# Patient Record
Sex: FEMALE | Race: WHITE | NOT HISPANIC OR LATINO | Employment: OTHER | URBAN - METROPOLITAN AREA
[De-identification: names, ages, dates, MRNs, and addresses within clinical notes are randomized per-mention and may not be internally consistent; named-entity substitution may affect disease eponyms.]

---

## 2017-04-18 DIAGNOSIS — R73.09 ELEVATED HEMOGLOBIN A1C: ICD-10-CM

## 2017-04-18 DIAGNOSIS — E03.9 ACQUIRED HYPOTHYROIDISM: ICD-10-CM

## 2017-04-18 DIAGNOSIS — I10 BENIGN ESSENTIAL HYPERTENSION: ICD-10-CM

## 2017-04-18 DIAGNOSIS — Z00.00 HEALTH CARE MAINTENANCE: Primary | ICD-10-CM

## 2017-04-18 DIAGNOSIS — R73.03 PREDIABETES: ICD-10-CM

## 2017-04-18 DIAGNOSIS — E78.2 MIXED HYPERLIPIDEMIA: ICD-10-CM

## 2017-04-22 LAB
ALBUMIN SERPL-MCNC: 4.3 G/DL (ref 3.5–5.2)
ALBUMIN/GLOB SERPL: 1.5 G/DL
ALP SERPL-CCNC: 67 U/L (ref 39–117)
ALT SERPL-CCNC: 17 U/L (ref 1–33)
APPEARANCE UR: ABNORMAL
AST SERPL-CCNC: 19 U/L (ref 1–32)
BACTERIA #/AREA URNS HPF: ABNORMAL /HPF
BACTERIA UR CULT: NO GROWTH
BACTERIA UR CULT: NORMAL
BASOPHILS # BLD AUTO: 0.04 10*3/MM3 (ref 0–0.2)
BASOPHILS NFR BLD AUTO: 0.9 % (ref 0–1.5)
BILIRUB SERPL-MCNC: 0.3 MG/DL (ref 0.1–1.2)
BILIRUB UR QL STRIP: NEGATIVE
BUN SERPL-MCNC: 16 MG/DL (ref 8–23)
BUN/CREAT SERPL: 19.3 (ref 7–25)
CALCIUM SERPL-MCNC: 9.7 MG/DL (ref 8.6–10.5)
CASTS URNS MICRO: ABNORMAL
CASTS URNS QL MICRO: PRESENT /LPF
CHLORIDE SERPL-SCNC: 100 MMOL/L (ref 98–107)
CHOLEST SERPL-MCNC: 223 MG/DL (ref 0–200)
CO2 SERPL-SCNC: 27.5 MMOL/L (ref 22–29)
COLOR UR: YELLOW
CREAT SERPL-MCNC: 0.83 MG/DL (ref 0.57–1)
CRYSTALS URNS MICRO: ABNORMAL
EOSINOPHIL # BLD AUTO: 0.07 10*3/MM3 (ref 0–0.7)
EOSINOPHIL NFR BLD AUTO: 1.6 % (ref 0.3–6.2)
EPI CELLS #/AREA URNS HPF: ABNORMAL /HPF
ERYTHROCYTE [DISTWIDTH] IN BLOOD BY AUTOMATED COUNT: 13.9 % (ref 11.7–13)
GLOBULIN SER CALC-MCNC: 2.8 GM/DL
GLUCOSE SERPL-MCNC: 102 MG/DL (ref 65–99)
GLUCOSE UR QL: NEGATIVE
HBA1C MFR BLD: 5.87 % (ref 4.8–5.6)
HCT VFR BLD AUTO: 40.7 % (ref 35.6–45.5)
HDLC SERPL-MCNC: 63 MG/DL (ref 40–60)
HGB BLD-MCNC: 12.8 G/DL (ref 11.9–15.5)
HGB UR QL STRIP: NEGATIVE
IMM GRANULOCYTES # BLD: 0 10*3/MM3 (ref 0–0.03)
IMM GRANULOCYTES NFR BLD: 0 % (ref 0–0.5)
KETONES UR QL STRIP: NEGATIVE
LDLC SERPL CALC-MCNC: 140 MG/DL (ref 0–100)
LEUKOCYTE ESTERASE UR QL STRIP: ABNORMAL
LYMPHOCYTES # BLD AUTO: 2.14 10*3/MM3 (ref 0.9–4.8)
LYMPHOCYTES NFR BLD AUTO: 49.5 % (ref 19.6–45.3)
MCH RBC QN AUTO: 30.8 PG (ref 26.9–32)
MCHC RBC AUTO-ENTMCNC: 31.4 G/DL (ref 32.4–36.3)
MCV RBC AUTO: 98.1 FL (ref 80.5–98.2)
MICRO URNS: ABNORMAL
MONOCYTES # BLD AUTO: 0.31 10*3/MM3 (ref 0.2–1.2)
MONOCYTES NFR BLD AUTO: 7.2 % (ref 5–12)
MUCOUS THREADS URNS QL MICRO: PRESENT /HPF
NEUTROPHILS # BLD AUTO: 1.76 10*3/MM3 (ref 1.9–8.1)
NEUTROPHILS NFR BLD AUTO: 40.8 % (ref 42.7–76)
NITRITE UR QL STRIP: NEGATIVE
PH UR STRIP: 7 [PH] (ref 5–7.5)
PLATELET # BLD AUTO: 230 10*3/MM3 (ref 140–500)
POTASSIUM SERPL-SCNC: 4.3 MMOL/L (ref 3.5–5.2)
PROT SERPL-MCNC: 7.1 G/DL (ref 6–8.5)
PROT UR QL STRIP: NEGATIVE
RBC # BLD AUTO: 4.15 10*6/MM3 (ref 3.9–5.2)
RBC #/AREA URNS HPF: ABNORMAL /HPF
SODIUM SERPL-SCNC: 141 MMOL/L (ref 136–145)
SP GR UR: 1.01 (ref 1–1.03)
TRIGL SERPL-MCNC: 101 MG/DL (ref 0–150)
TSH SERPL DL<=0.005 MIU/L-ACNC: 3.12 MIU/ML (ref 0.27–4.2)
UNIDENT CRYS URNS QL MICRO: PRESENT /LPF
URINALYSIS REFLEX: ABNORMAL
UROBILINOGEN UR STRIP-MCNC: 0.2 MG/DL (ref 0.2–1)
VLDLC SERPL CALC-MCNC: 20.2 MG/DL (ref 5–40)
WBC # BLD AUTO: 4.32 10*3/MM3 (ref 4.5–10.7)
WBC #/AREA URNS HPF: ABNORMAL /HPF

## 2017-04-27 ENCOUNTER — OFFICE VISIT (OUTPATIENT)
Dept: INTERNAL MEDICINE | Facility: CLINIC | Age: 67
End: 2017-04-27

## 2017-04-27 VITALS
OXYGEN SATURATION: 99 % | RESPIRATION RATE: 18 BRPM | SYSTOLIC BLOOD PRESSURE: 124 MMHG | HEART RATE: 72 BPM | WEIGHT: 164 LBS | BODY MASS INDEX: 24.86 KG/M2 | DIASTOLIC BLOOD PRESSURE: 82 MMHG | HEIGHT: 68 IN

## 2017-04-27 DIAGNOSIS — Z00.00 MEDICARE ANNUAL WELLNESS VISIT, SUBSEQUENT: ICD-10-CM

## 2017-04-27 DIAGNOSIS — I10 BENIGN ESSENTIAL HYPERTENSION: ICD-10-CM

## 2017-04-27 DIAGNOSIS — Z00.00 HEALTH MAINTENANCE EXAMINATION: Primary | ICD-10-CM

## 2017-04-27 DIAGNOSIS — E03.9 ACQUIRED HYPOTHYROIDISM: ICD-10-CM

## 2017-04-27 DIAGNOSIS — R73.03 PREDIABETES: ICD-10-CM

## 2017-04-27 PROCEDURE — 99397 PER PM REEVAL EST PAT 65+ YR: CPT | Performed by: INTERNAL MEDICINE

## 2017-04-27 PROCEDURE — G0439 PPPS, SUBSEQ VISIT: HCPCS | Performed by: INTERNAL MEDICINE

## 2017-04-27 NOTE — PATIENT INSTRUCTIONS
Medicare Wellness  Personal Prevention Plan of Service     Date of Office Visit:  2017  Encounter Provider:  Quynh Munoz MD  Place of Service:  [facility] INTERNAL MEDICINE  Patient Name: Krys Slade  :  1950    As part of the Medicare Wellness portion of your visit today, we are providing you with this personalized preventive plan of services (PPPS). This plan is based upon recommendations of the [place] Preventive Services Task Force (USPSTF) and the Advisory Committee on Immunization Practices (ACIP).    This lists the preventive care services that should be considered, and provides dates of when you are due. Items listed as completed are up-to-date and do not require any further intervention.    Health Maintenance   Topic Date Due   • MAMMOGRAM  2017   • LIPID PANEL  2018   • MEDICARE ANNUAL WELLNESS  2018   • TDAP/TD VACCINES (2 - Td) 2022   • COLONOSCOPY  2023   • HEPATITIS C SCREENING  Completed   • INFLUENZA VACCINE  Completed   • PNEUMOCOCCAL VACCINES (65+ LOW/MEDIUM RISK)  Completed   • ZOSTER VACCINE  Completed       No orders of the defined types were placed in this encounter.      No Follow-up on file.

## 2017-04-27 NOTE — PROGRESS NOTES
Medicare Annual Wellness Visit    Chief Complaint:  Annual Exam (SUB AWV & CPE)      History of Present Illness    Krys Slade is a 67 y.o. female who presents for an Annual Wellness Visit. In addition, we addressed the following health issues:  No new concerns.      Mammo:5/26/16  Pap: n/a Dr. Russell 5/13/16  DEXA:12/16/14  C-scope:2013 due 2018 Dr. Mcduffie  Dental Exam: she has one set up on May 5th.  She goes every six month  Eye Exam: every summer  Exercise: average 8,845 steps a day.  Gardening and walking.      Advanced Care Planning:  has an advance directive - a copy has been provided and is in file   Immunization History   Administered Date(s) Administered   • Influenza Split High Dose Preservative Free IM 10/12/2016   • Influenza, Quadrivalent 11/07/2013, 09/16/2014, 10/15/2015   • Pneumococcal Conjugate 13-Valent 04/26/2016   • Pneumococcal Polysaccharide 09/16/2014   • Tdap 01/01/2012       Review of Systems   Constitutional: Negative for chills, fatigue and fever.   HENT: Positive for hearing loss (this doesn't bother her that much.  ). Negative for sore throat, tinnitus, trouble swallowing and voice change.    Eyes: Negative for visual disturbance.   Respiratory: Negative for cough and shortness of breath.    Cardiovascular: Negative for chest pain, palpitations and leg swelling.   Gastrointestinal: Negative for abdominal distention, abdominal pain, anal bleeding, blood in stool, constipation, diarrhea, nausea and vomiting.   Endocrine: Negative for polydipsia and polyuria.   Genitourinary: Negative for dysuria and hematuria.   Musculoskeletal: Positive for arthralgias. Negative for myalgias.   Skin: Negative for rash and wound.   Neurological: Negative for dizziness, syncope, light-headedness and headaches.   Hematological: Negative for adenopathy. Does not bruise/bleed easily.   Psychiatric/Behavioral: Negative for confusion and dysphoric mood. The patient is not nervous/anxious.        Past  Medical History:   Diagnosis Date   • Colon polyp    • Disease of thyroid gland    • Hyperlipidemia    • Hypertension    • Lung mass    • Tonsillitis        Past Surgical History:   Procedure Laterality Date   • HYSTERECTOMY     • MOHS SURGERY     • TONSILLECTOMY         Social History     Social History   • Marital status: Single     Spouse name: N/A   • Number of children: 2   • Years of education: N/A     Occupational History   • retired teacher      Social History Main Topics   • Smoking status: Never Smoker   • Smokeless tobacco: Not on file   • Alcohol use Yes      Comment: minimal   • Drug use: No   • Sexual activity: Not on file     Other Topics Concern   • Not on file     Social History Narrative       Family History   Problem Relation Age of Onset   • Heart valve disorder Mother    • Stroke Mother    • Coronary artery disease Father    • Macular degeneration Father    • Colon cancer Brother        No Known Allergies    Current Outpatient Prescriptions on File Prior to Visit   Medication Sig Dispense Refill   • calcium carbonate (OS-EMILIA) 600 MG tablet Take 600 mg by mouth daily.     • Cholecalciferol (VITAMIN D3) 2000 UNITS tablet Take  by mouth.     • ESTRACE VAGINAL 0.1 MG/GM vaginal cream      • Glucosamine-Chondroit-Vit C-Mn (GLUCOSAMINE 1500 COMPLEX PO) Take  by mouth.     • lisinopril (PRINIVIL,ZESTRIL) 20 MG tablet TAKE 1 TABLET DAILY 90 tablet 2   • Multiple Vitamins-Minerals (MULTIVITAMIN ADULT PO) Take  by mouth.     • psyllium (METAMUCIL) 58.6 % packet Take 1 packet by mouth daily.     • SYNTHROID 50 MCG tablet TAKE 1 TABLET DAILY 90 tablet 2     No current facility-administered medications on file prior to visit.        Patient Active Problem List   Diagnosis   • Benign essential hypertension   • Hyperlipidemia   • Hypothyroidism   • Lung mass   • Prediabetes   • Abnormal CBC       Health Risk Assessment/Depression Screen/Functional and Cognitive Screening:   The patient has completed a Health  "Risk Assessment & Depression screen. These have been reviewed with them and have been scanned as a separate documents.    Age-appropriate Screening Schedule:  Refer to the list below for future screening recommendations based on patient's age. Orders for these recommended tests are listed in the plan section. The patient has been provided with a written plan.     I have reviewed their problem list, past medical history, family history, social history, and surgical history.     Vitals:    04/27/17 0927   BP: 124/82   Pulse: 72   Resp: 18   SpO2: 99%   Weight: 164 lb (74.4 kg)   Height: 68\" (172.7 cm)   PainSc: 0-No pain       Body mass index is 24.94 kg/(m^2).    Physical Exam   Constitutional: She is oriented to person, place, and time. She appears well-developed and well-nourished. No distress.   HENT:   Head: Normocephalic and atraumatic.   Nose: Nose normal.   Mouth/Throat: Oropharynx is clear and moist.   Eyes: Conjunctivae and EOM are normal. Pupils are equal, round, and reactive to light. No scleral icterus.   Neck: Normal range of motion. Neck supple. No thyromegaly present.   Cardiovascular: Normal rate, regular rhythm and normal heart sounds.  Exam reveals no gallop and no friction rub.    No murmur heard.  Pulses:       Carotid pulses are 2+ on the right side, and 2+ on the left side.       Femoral pulses are 2+ on the right side, and 2+ on the left side.       Dorsalis pedis pulses are 2+ on the right side, and 2+ on the left side.        Posterior tibial pulses are 2+ on the right side, and 2+ on the left side.   Pulmonary/Chest: Effort normal and breath sounds normal. No respiratory distress. She has no wheezes. She has no rales. Right breast exhibits no mass and no nipple discharge. Left breast exhibits no mass and no nipple discharge.   Abdominal: Soft. Bowel sounds are normal. She exhibits no distension and no mass. There is no tenderness.   Musculoskeletal: Normal range of motion. She exhibits no " edema.       Vascular Status -  Her exam exhibits no right foot edema. Her exam exhibits no left foot edema.   Skin Integrity  -  Her right foot skin is intact.     Krys 's left foot skin is intact. .  Lymphadenopathy:     She has no cervical adenopathy.     She has no axillary adenopathy.        Right: No inguinal and no supraclavicular adenopathy present.        Left: No inguinal and no supraclavicular adenopathy present.   Neurological: She is alert and oriented to person, place, and time. She has normal reflexes. No cranial nerve deficit.   Skin: Skin is warm and dry.   Psychiatric: She has a normal mood and affect. Her speech is normal and behavior is normal. Judgment and thought content normal. Cognition and memory are normal.   Vitals reviewed.      Results for orders placed or performed in visit on 04/18/17   Comprehensive Metabolic Panel   Result Value Ref Range    Glucose 102 (H) 65 - 99 mg/dL    BUN 16 8 - 23 mg/dL    Creatinine 0.83 0.57 - 1.00 mg/dL    eGFR Non African Am 68 >60 mL/min/1.73    eGFR African Am 83 >60 mL/min/1.73    BUN/Creatinine Ratio 19.3 7.0 - 25.0    Sodium 141 136 - 145 mmol/L    Potassium 4.3 3.5 - 5.2 mmol/L    Chloride 100 98 - 107 mmol/L    Total CO2 27.5 22.0 - 29.0 mmol/L    Calcium 9.7 8.6 - 10.5 mg/dL    Total Protein 7.1 6.0 - 8.5 g/dL    Albumin 4.30 3.50 - 5.20 g/dL    Globulin 2.8 gm/dL    A/G Ratio 1.5 g/dL    Total Bilirubin 0.3 0.1 - 1.2 mg/dL    Alkaline Phosphatase 67 39 - 117 U/L    AST (SGOT) 19 1 - 32 U/L    ALT (SGPT) 17 1 - 33 U/L   Lipid Panel   Result Value Ref Range    Total Cholesterol 223 (H) 0 - 200 mg/dL    Triglycerides 101 0 - 150 mg/dL    HDL Cholesterol 63 (H) 40 - 60 mg/dL    VLDL Cholesterol 20.2 5 - 40 mg/dL    LDL Cholesterol  140 (H) 0 - 100 mg/dL   TSH Rfx On Abnormal To Free T4   Result Value Ref Range    TSH 3.12 0.27 - 4.2 mIU/mL   UA / M With / Rflx Culture([facility] ONLY)   Result Value Ref Range    Specific Gravity, UA 1.010 1.005 -  1.030    pH, UA 7.0 5.0 - 7.5    Color, UA Yellow Yellow    Appearance, UA Cloudy (A) Clear    Leukocytes, UA 3+ (A) Negative    Protein Negative Negative/Trace    Glucose, UA Negative Negative    Ketones Negative Negative    Blood, UA Negative Negative    Bilirubin, UA Negative Negative    Urobilinogen, UA 0.2 0.2 - 1.0 mg/dL    Nitrite, UA Negative Negative    Microscopic Examination See below:     Urinalysis Reflex Comment    Hemoglobin A1c   Result Value Ref Range    Hemoglobin A1C 5.87 (H) 4.80 - 5.60 %   Microscopic Examination   Result Value Ref Range    WBC, UA 6-10 (A) 0 - 5 /hpf    RBC, UA 3-10 (A) 0 - 2 /hpf    Epithelial Cells (non renal) 0-10 0 - 10 /hpf    Casts Present (A) None seen /lpf    Cast Type Granular casts (A) N/A    Crystals, UA Present (A) N/A /lpf    Crystal Type Amorphous Sediment N/A    Mucus, UA Present Not Estab. /hpf    Bacteria, UA Few None seen/Few /hpf   Urine Culture, Routine   Result Value Ref Range    Urine Culture Final report     Result 1 No growth    CBC & Differential   Result Value Ref Range    WBC 4.32 (L) 4.50 - 10.70 10*3/mm3    RBC 4.15 3.90 - 5.20 10*6/mm3    Hemoglobin 12.8 11.9 - 15.5 g/dL    Hematocrit 40.7 35.6 - 45.5 %    MCV 98.1 80.5 - 98.2 fL    MCH 30.8 26.9 - 32.0 pg    MCHC 31.4 (L) 32.4 - 36.3 g/dL    RDW 13.9 (H) 11.7 - 13.0 %    Platelets 230 140 - 500 10*3/mm3    Neutrophil Rel % 40.8 (L) 42.7 - 76.0 %    Lymphocyte Rel % 49.5 (H) 19.6 - 45.3 %    Monocyte Rel % 7.2 5.0 - 12.0 %    Eosinophil Rel % 1.6 0.3 - 6.2 %    Basophil Rel % 0.9 0.0 - 1.5 %    Neutrophils Absolute 1.76 (L) 1.90 - 8.10 10*3/mm3    Lymphocytes Absolute 2.14 0.90 - 4.80 10*3/mm3    Monocytes Absolute 0.31 0.20 - 1.20 10*3/mm3    Eosinophils Absolute 0.07 0.00 - 0.70 10*3/mm3    Basophils Absolute 0.04 0.00 - 0.20 10*3/mm3    Immature Granulocyte Rel % 0.0 0.0 - 0.5 %    Immature Grans Absolute 0.00 0.00 - 0.03 10*3/mm3       Assessment & Plan:    Diagnoses and all orders for this  visit:    Health maintenance examination    Medicare annual wellness visit, subsequent    Acquired hypothyroidism    Benign essential hypertension    Prediabetes        Discussion/Summary    Krys is here for CPE and AWV.  She is up to date on all health maintenance.  Her bp and thyroid are well controlled.  Advised to continue all current meds.  She needs to keep watching her diet and carbs.  Continue to stay active.  She declines any further testing on her lung nodule.  Last year there had been two years of stability.  She will let us know if she changes her mind.  She feels certain this is nothing to worry about.      Follow Up:  No Follow-up on file.     An After Visit Summary and PPPS with all of these plans were given to the patient.

## 2017-08-07 RX ORDER — LISINOPRIL 20 MG/1
TABLET ORAL
Qty: 90 TABLET | Refills: 3 | Status: SHIPPED | OUTPATIENT
Start: 2017-08-07 | End: 2018-07-26 | Stop reason: SDUPTHER

## 2017-08-07 RX ORDER — LEVOTHYROXINE SODIUM 50 MCG
TABLET ORAL
Qty: 90 TABLET | Refills: 3 | Status: SHIPPED | OUTPATIENT
Start: 2017-08-07 | End: 2018-04-30 | Stop reason: SDUPTHER

## 2018-02-27 ENCOUNTER — APPOINTMENT (OUTPATIENT)
Dept: WOMENS IMAGING | Facility: HOSPITAL | Age: 68
End: 2018-02-27

## 2018-02-27 PROCEDURE — 77067 SCR MAMMO BI INCL CAD: CPT | Performed by: RADIOLOGY

## 2018-02-27 PROCEDURE — 77063 BREAST TOMOSYNTHESIS BI: CPT | Performed by: RADIOLOGY

## 2018-03-08 ENCOUNTER — APPOINTMENT (OUTPATIENT)
Dept: WOMENS IMAGING | Facility: HOSPITAL | Age: 68
End: 2018-03-08

## 2018-03-08 PROCEDURE — 77065 DX MAMMO INCL CAD UNI: CPT | Performed by: RADIOLOGY

## 2018-03-08 PROCEDURE — G0279 TOMOSYNTHESIS, MAMMO: HCPCS | Performed by: RADIOLOGY

## 2018-03-08 PROCEDURE — 76641 ULTRASOUND BREAST COMPLETE: CPT | Performed by: RADIOLOGY

## 2018-04-19 DIAGNOSIS — E78.2 MIXED HYPERLIPIDEMIA: ICD-10-CM

## 2018-04-19 DIAGNOSIS — I10 BENIGN ESSENTIAL HYPERTENSION: ICD-10-CM

## 2018-04-19 DIAGNOSIS — E03.9 ACQUIRED HYPOTHYROIDISM: ICD-10-CM

## 2018-04-19 DIAGNOSIS — Z00.00 HEALTH CARE MAINTENANCE: Primary | ICD-10-CM

## 2018-04-19 DIAGNOSIS — R73.03 PREDIABETES: ICD-10-CM

## 2018-04-26 LAB
ALBUMIN SERPL-MCNC: 4.5 G/DL (ref 3.5–5.2)
ALBUMIN/GLOB SERPL: 2 G/DL
ALP SERPL-CCNC: 57 U/L (ref 39–117)
ALT SERPL-CCNC: 10 U/L (ref 1–33)
APPEARANCE UR: ABNORMAL
AST SERPL-CCNC: 11 U/L (ref 1–32)
BACTERIA #/AREA URNS HPF: ABNORMAL /HPF
BACTERIA UR CULT: NORMAL
BACTERIA UR CULT: NORMAL
BASOPHILS # BLD AUTO: 0.06 10*3/MM3 (ref 0–0.2)
BASOPHILS NFR BLD AUTO: 1.1 % (ref 0–1.5)
BILIRUB SERPL-MCNC: 0.3 MG/DL (ref 0.1–1.2)
BILIRUB UR QL STRIP: NEGATIVE
BUN SERPL-MCNC: 18 MG/DL (ref 8–23)
BUN/CREAT SERPL: 20.7 (ref 7–25)
CALCIUM SERPL-MCNC: 9.7 MG/DL (ref 8.6–10.5)
CASTS URNS MICRO: ABNORMAL
CASTS URNS QL MICRO: PRESENT /LPF
CHLORIDE SERPL-SCNC: 101 MMOL/L (ref 98–107)
CHOLEST SERPL-MCNC: 244 MG/DL (ref 0–200)
CO2 SERPL-SCNC: 25.4 MMOL/L (ref 22–29)
COLOR UR: YELLOW
CREAT SERPL-MCNC: 0.87 MG/DL (ref 0.57–1)
CRYSTALS URNS MICRO: ABNORMAL
EOSINOPHIL # BLD AUTO: 0.16 10*3/MM3 (ref 0–0.7)
EOSINOPHIL NFR BLD AUTO: 3 % (ref 0.3–6.2)
EPI CELLS #/AREA URNS HPF: ABNORMAL /HPF
ERYTHROCYTE [DISTWIDTH] IN BLOOD BY AUTOMATED COUNT: 13.8 % (ref 11.7–13)
GFR SERPLBLD CREATININE-BSD FMLA CKD-EPI: 65 ML/MIN/1.73
GFR SERPLBLD CREATININE-BSD FMLA CKD-EPI: 78 ML/MIN/1.73
GLOBULIN SER CALC-MCNC: 2.2 GM/DL
GLUCOSE SERPL-MCNC: 101 MG/DL (ref 65–99)
GLUCOSE UR QL: NEGATIVE
HBA1C MFR BLD: 5.9 % (ref 4.8–5.6)
HCT VFR BLD AUTO: 40 % (ref 35.6–45.5)
HDLC SERPL-MCNC: 67 MG/DL (ref 40–60)
HGB BLD-MCNC: 12.5 G/DL (ref 11.9–15.5)
HGB UR QL STRIP: NEGATIVE
IMM GRANULOCYTES # BLD: 0 10*3/MM3 (ref 0–0.03)
IMM GRANULOCYTES NFR BLD: 0 % (ref 0–0.5)
KETONES UR QL STRIP: NEGATIVE
LDLC SERPL CALC-MCNC: 158 MG/DL (ref 0–100)
LEUKOCYTE ESTERASE UR QL STRIP: ABNORMAL
LYMPHOCYTES # BLD AUTO: 2.44 10*3/MM3 (ref 0.9–4.8)
LYMPHOCYTES NFR BLD AUTO: 46.5 % (ref 19.6–45.3)
MCH RBC QN AUTO: 31 PG (ref 26.9–32)
MCHC RBC AUTO-ENTMCNC: 31.3 G/DL (ref 32.4–36.3)
MCV RBC AUTO: 99.3 FL (ref 80.5–98.2)
MICRO URNS: ABNORMAL
MONOCYTES # BLD AUTO: 0.38 10*3/MM3 (ref 0.2–1.2)
MONOCYTES NFR BLD AUTO: 7.2 % (ref 5–12)
MUCOUS THREADS URNS QL MICRO: PRESENT /HPF
NEUTROPHILS # BLD AUTO: 2.21 10*3/MM3 (ref 1.9–8.1)
NEUTROPHILS NFR BLD AUTO: 42.2 % (ref 42.7–76)
NITRITE UR QL STRIP: NEGATIVE
PH UR STRIP: 6 [PH] (ref 5–7.5)
PLATELET # BLD AUTO: 246 10*3/MM3 (ref 140–500)
POTASSIUM SERPL-SCNC: 4.7 MMOL/L (ref 3.5–5.2)
PROT SERPL-MCNC: 6.7 G/DL (ref 6–8.5)
PROT UR QL STRIP: NEGATIVE
RBC # BLD AUTO: 4.03 10*6/MM3 (ref 3.9–5.2)
RBC #/AREA URNS HPF: ABNORMAL /HPF
SODIUM SERPL-SCNC: 142 MMOL/L (ref 136–145)
SP GR UR: 1.02 (ref 1–1.03)
T4 FREE SERPL-MCNC: 1.11 NG/DL (ref 0.93–1.7)
TRIGL SERPL-MCNC: 96 MG/DL (ref 0–150)
TSH SERPL DL<=0.005 MIU/L-ACNC: 4.43 MIU/ML (ref 0.27–4.2)
UNIDENT CRYS URNS QL MICRO: PRESENT /LPF
URINALYSIS REFLEX: ABNORMAL
UROBILINOGEN UR STRIP-MCNC: 0.2 MG/DL (ref 0.2–1)
VLDLC SERPL CALC-MCNC: 19.2 MG/DL (ref 5–40)
WBC # BLD AUTO: 5.25 10*3/MM3 (ref 4.5–10.7)
WBC #/AREA URNS HPF: ABNORMAL /HPF

## 2018-04-30 ENCOUNTER — OFFICE VISIT (OUTPATIENT)
Dept: INTERNAL MEDICINE | Facility: CLINIC | Age: 68
End: 2018-04-30

## 2018-04-30 VITALS
HEART RATE: 78 BPM | BODY MASS INDEX: 24.4 KG/M2 | OXYGEN SATURATION: 99 % | RESPIRATION RATE: 18 BRPM | WEIGHT: 161 LBS | HEIGHT: 68 IN | DIASTOLIC BLOOD PRESSURE: 68 MMHG | SYSTOLIC BLOOD PRESSURE: 118 MMHG

## 2018-04-30 DIAGNOSIS — Z00.00 MEDICARE ANNUAL WELLNESS VISIT, SUBSEQUENT: ICD-10-CM

## 2018-04-30 DIAGNOSIS — Z12.11 COLON CANCER SCREENING: ICD-10-CM

## 2018-04-30 DIAGNOSIS — E03.9 ACQUIRED HYPOTHYROIDISM: ICD-10-CM

## 2018-04-30 DIAGNOSIS — I10 BENIGN ESSENTIAL HYPERTENSION: ICD-10-CM

## 2018-04-30 DIAGNOSIS — Z00.00 HEALTH MAINTENANCE EXAMINATION: Primary | ICD-10-CM

## 2018-04-30 DIAGNOSIS — R73.03 PREDIABETES: ICD-10-CM

## 2018-04-30 PROCEDURE — G0439 PPPS, SUBSEQ VISIT: HCPCS | Performed by: INTERNAL MEDICINE

## 2018-04-30 PROCEDURE — 99397 PER PM REEVAL EST PAT 65+ YR: CPT | Performed by: INTERNAL MEDICINE

## 2018-04-30 RX ORDER — LEVOTHYROXINE SODIUM 0.07 MG/1
75 TABLET ORAL DAILY
Qty: 90 TABLET | Refills: 3 | Status: SHIPPED | OUTPATIENT
Start: 2018-04-30 | End: 2019-05-07 | Stop reason: SDUPTHER

## 2018-04-30 NOTE — PATIENT INSTRUCTIONS
Medicare Wellness  Personal Prevention Plan of Service     Date of Office Visit:  2018  Encounter Provider:  Quynh Munoz MD  Place of Service:  [facility] INTERNAL MEDICINE  Patient Name: Krys Slade  :  1950    As part of the Medicare Wellness portion of your visit today, we are providing you with this personalized preventive plan of services (PPPS). This plan is based upon recommendations of the [place] Preventive Services Task Force (USPSTF) and the Advisory Committee on Immunization Practices (ACIP).    This lists the preventive care services that should be considered, and provides dates of when you are due. Items listed as completed are up-to-date and do not require any further intervention.    Health Maintenance   Topic Date Due   • MAMMOGRAM  2017   • MEDICARE ANNUAL WELLNESS  2018   • INFLUENZA VACCINE  2018   • LIPID PANEL  2019   • TDAP/TD VACCINES (2 - Td) 2022   • COLONOSCOPY  2023   • HEPATITIS C SCREENING  Completed   • PNEUMOCOCCAL VACCINES (65+ LOW/MEDIUM RISK)  Completed   • ZOSTER VACCINE  Completed       No orders of the defined types were placed in this encounter.      No Follow-up on file.

## 2018-04-30 NOTE — PROGRESS NOTES
Medicare Annual Wellness Visit & CPE    Chief Complaint:  Annual Exam (SUB AWV & CPE)      History of Present Illness    Krys Slade is a 68 y.o. female who presents for an Annual Wellness Visit & CPE. In addition, we addressed the following health issues:    Mammo: 1/2018  Pap: n/a  DEXA: 2014 normal  C-scope: 2013 due 2018 she had polyps on her first scope and has a brother who has colon cancer.  Dental Exam: Nov 2017  Eye Exam: Fall 2017  Exercise: walks about 5000 -6000 steps a day  Advanced Care Planning:  has an advance directive - a copy has been provided and is in file   Immunization History   Administered Date(s) Administered   • Flu Vaccine High Dose PF 65YR+ 10/12/2016, 10/19/2017   • Influenza, Quadrivalent 11/07/2013, 09/16/2014, 10/15/2015   • Pneumococcal Conjugate 13-Valent (PCV13) 04/26/2016   • Pneumococcal Polysaccharide (PPSV23) 09/16/2014   • Tdap 01/01/2012       Review of Systems   Constitution: Negative for chills, fever and malaise/fatigue.   HENT: Positive for hearing loss (she's had her hearing tested.). Negative for hoarse voice and sore throat.    Eyes: Negative for blurred vision, double vision and visual disturbance.   Cardiovascular: Negative for chest pain, leg swelling and palpitations.   Respiratory: Negative for cough and shortness of breath.    Endocrine: Negative for polydipsia and polyuria.   Hematologic/Lymphatic: Does not bruise/bleed easily.   Skin: Negative for rash and suspicious lesions.   Musculoskeletal: Negative for arthritis and myalgias.   Gastrointestinal: Negative for bloating, abdominal pain, change in bowel habit, constipation, diarrhea, dysphagia, hematochezia, melena, nausea and vomiting.   Genitourinary: Negative for dysuria and hematuria.   Neurological: Negative for dizziness, headaches and light-headedness.   Psychiatric/Behavioral: Negative for depression. The patient is not nervous/anxious.        Past Medical History:   Diagnosis Date   • Colon polyp     • Disease of thyroid gland    • Hyperlipidemia    • Hypertension    • Lung mass    • Tonsillitis        Past Surgical History:   Procedure Laterality Date   • HYSTERECTOMY     • MOHS SURGERY     • TONSILLECTOMY         Social History     Social History   • Marital status: Single     Spouse name: N/A   • Number of children: 2   • Years of education: N/A     Occupational History   • retired teacher      Social History Main Topics   • Smoking status: Never Smoker   • Smokeless tobacco: Never Used   • Alcohol use Yes      Comment: minimal   • Drug use: No   • Sexual activity: Not on file     Other Topics Concern   • Not on file     Social History Narrative   • No narrative on file       Family History   Problem Relation Age of Onset   • Heart valve disorder Mother    • Stroke Mother    • Coronary artery disease Father    • Macular degeneration Father    • Colon cancer Brother        No Known Allergies    Current Outpatient Prescriptions on File Prior to Visit   Medication Sig Dispense Refill   • calcium carbonate (OS-EMILIA) 600 MG tablet Take 600 mg by mouth daily.     • Cholecalciferol (VITAMIN D3) 2000 UNITS tablet Take  by mouth.     • ESTRACE VAGINAL 0.1 MG/GM vaginal cream      • Glucosamine-Chondroit-Vit C-Mn (GLUCOSAMINE 1500 COMPLEX PO) Take  by mouth.     • lisinopril (PRINIVIL,ZESTRIL) 20 MG tablet TAKE 1 TABLET DAILY 90 tablet 3   • psyllium (METAMUCIL) 58.6 % packet Take 1 packet by mouth daily.     • SYNTHROID 50 MCG tablet TAKE 1 TABLET DAILY 90 tablet 3     No current facility-administered medications on file prior to visit.        Patient Active Problem List   Diagnosis   • Benign essential hypertension   • Hyperlipidemia   • Hypothyroidism   • Lung mass   • Prediabetes   • Abnormal CBC       Health Risk Assessment/Depression Screen/Functional and Cognitive Screening:   The patient has completed a Health Risk Assessment & Depression screen. These have been reviewed with them and have been scanned as a  "separate documents.    Age-appropriate Screening Schedule:  Refer to the list below for future screening recommendations based on patient's age. Orders for these recommended tests are listed in the plan section. The patient has been provided with a written plan.     I have reviewed their problem list, past medical history, family history, social history, and surgical history.     Vitals:    04/30/18 0917   BP: 118/68   Pulse: 78   Resp: 18   SpO2: 99%   Weight: 73 kg (161 lb)   Height: 172.7 cm (67.99\")   PainSc: 0-No pain       Body mass index is 24.49 kg/m².    Physical Exam   Constitutional: She is oriented to person, place, and time. She appears well-developed and well-nourished. No distress.   HENT:   Head: Normocephalic and atraumatic.   Nose: Nose normal.   Mouth/Throat: Oropharynx is clear and moist.   Eyes: Conjunctivae and EOM are normal. Pupils are equal, round, and reactive to light. No scleral icterus.   Neck: Normal range of motion. Neck supple. No thyromegaly present.   Cardiovascular: Normal rate, regular rhythm and normal heart sounds.  Exam reveals no gallop and no friction rub.    No murmur heard.  Pulses:       Carotid pulses are 2+ on the right side, and 2+ on the left side.       Femoral pulses are 2+ on the right side, and 2+ on the left side.       Dorsalis pedis pulses are 2+ on the right side, and 2+ on the left side.        Posterior tibial pulses are 2+ on the right side, and 2+ on the left side.   Pulmonary/Chest: Effort normal and breath sounds normal. No respiratory distress. She has no wheezes. She has no rales. Right breast exhibits no mass and no nipple discharge. Left breast exhibits no mass and no nipple discharge.   Abdominal: Soft. Bowel sounds are normal. She exhibits no distension and no mass. There is no tenderness.   Musculoskeletal: Normal range of motion. She exhibits no edema.     Vascular Status -  Her right foot exhibits no edema. Her left foot exhibits no edema.  Skin " Integrity  -  Her right foot skin is intact.Her left foot skin is intact..  Lymphadenopathy:     She has no cervical adenopathy.     She has no axillary adenopathy.        Right: No inguinal and no supraclavicular adenopathy present.        Left: No inguinal and no supraclavicular adenopathy present.   Neurological: She is alert and oriented to person, place, and time. She has normal reflexes. No cranial nerve deficit.   Skin: Skin is warm and dry.   Psychiatric: She has a normal mood and affect. Her speech is normal and behavior is normal. Judgment and thought content normal. Cognition and memory are normal.   Vitals reviewed.      Results for orders placed or performed in visit on 04/19/18   Urine culture, Comprehensive   Result Value Ref Range    Urine Culture Final report     Result 1 Comment    Comprehensive Metabolic Panel   Result Value Ref Range    Glucose 101 (H) 65 - 99 mg/dL    BUN 18 8 - 23 mg/dL    Creatinine 0.87 0.57 - 1.00 mg/dL    eGFR Non African Am 65 >60 mL/min/1.73    eGFR African Am 78 >60 mL/min/1.73    BUN/Creatinine Ratio 20.7 7.0 - 25.0    Sodium 142 136 - 145 mmol/L    Potassium 4.7 3.5 - 5.2 mmol/L    Chloride 101 98 - 107 mmol/L    Total CO2 25.4 22.0 - 29.0 mmol/L    Calcium 9.7 8.6 - 10.5 mg/dL    Total Protein 6.7 6.0 - 8.5 g/dL    Albumin 4.50 3.50 - 5.20 g/dL    Globulin 2.2 gm/dL    A/G Ratio 2.0 g/dL    Total Bilirubin 0.3 0.1 - 1.2 mg/dL    Alkaline Phosphatase 57 39 - 117 U/L    AST (SGOT) 11 1 - 32 U/L    ALT (SGPT) 10 1 - 33 U/L   Lipid Panel   Result Value Ref Range    Total Cholesterol 244 (H) 0 - 200 mg/dL    Triglycerides 96 0 - 150 mg/dL    HDL Cholesterol 67 (H) 40 - 60 mg/dL    VLDL Cholesterol 19.2 5 - 40 mg/dL    LDL Cholesterol  158 (H) 0 - 100 mg/dL   TSH Rfx On Abnormal To Free T4   Result Value Ref Range    TSH 4.43 (H) 0.27 - 4.2 mIU/mL   Urinalysis With / Culture If Indicated - Urine, Clean Catch   Result Value Ref Range    Specific Gravity, UA 1.023 1.005 -  1.030    pH, UA 6.0 5.0 - 7.5    Color, UA Yellow Yellow    Appearance, UA Turbid (A) Clear    Leukocytes, UA 2+ (A) Negative    Protein Negative Negative/Trace    Glucose, UA Negative Negative    Ketones Negative Negative    Blood, UA Negative Negative    Bilirubin, UA Negative Negative    Urobilinogen, UA 0.2 0.2 - 1.0 mg/dL    Nitrite, UA Negative Negative    Microscopic Examination See below:     Urinalysis Reflex Comment    Hemoglobin A1c   Result Value Ref Range    Hemoglobin A1C 5.90 (H) 4.80 - 5.60 %   Microscopic Examination   Result Value Ref Range    WBC, UA 0-5 0 - 5 /hpf    RBC, UA 0-2 0 - 2 /hpf    Epithelial Cells (non renal) 0-10 0 - 10 /hpf    Casts Present (A) None seen /lpf    Cast Type Granular casts (A) N/A    Crystals, UA Present (A) N/A /lpf    Crystal Type Amorphous Sediment N/A    Mucus, UA Present Not Estab. /hpf    Bacteria, UA Few None seen/Few /hpf   T4F   Result Value Ref Range    Free T4 1.11 0.93 - 1.70 ng/dL   CBC & Differential   Result Value Ref Range    WBC 5.25 4.50 - 10.70 10*3/mm3    RBC 4.03 3.90 - 5.20 10*6/mm3    Hemoglobin 12.5 11.9 - 15.5 g/dL    Hematocrit 40.0 35.6 - 45.5 %    MCV 99.3 (H) 80.5 - 98.2 fL    MCH 31.0 26.9 - 32.0 pg    MCHC 31.3 (L) 32.4 - 36.3 g/dL    RDW 13.8 (H) 11.7 - 13.0 %    Platelets 246 140 - 500 10*3/mm3    Neutrophil Rel % 42.2 (L) 42.7 - 76.0 %    Lymphocyte Rel % 46.5 (H) 19.6 - 45.3 %    Monocyte Rel % 7.2 5.0 - 12.0 %    Eosinophil Rel % 3.0 0.3 - 6.2 %    Basophil Rel % 1.1 0.0 - 1.5 %    Neutrophils Absolute 2.21 1.90 - 8.10 10*3/mm3    Lymphocytes Absolute 2.44 0.90 - 4.80 10*3/mm3    Monocytes Absolute 0.38 0.20 - 1.20 10*3/mm3    Eosinophils Absolute 0.16 0.00 - 0.70 10*3/mm3    Basophils Absolute 0.06 0.00 - 0.20 10*3/mm3    Immature Granulocyte Rel % 0.0 0.0 - 0.5 %    Immature Grans Absolute 0.00 0.00 - 0.03 10*3/mm3       Assessment & Plan:    Diagnoses and all orders for this visit:    Health maintenance examination    Medicare  annual wellness visit, subsequent    Benign essential hypertension    Acquired hypothyroidism    Prediabetes        Discussion/Summary  Krys is here for routine CPE and AWV.  She is up to date on health maintenance but is due for c-scope this year.  Her bp is very well controlled.  Her thyroid needs to be increased from 50 to 75.  Will repeat labs in 6 weeks.  Continue to work on diet and exercise.  I have encouraged her to really focus on reducing the cholesterol in her diet.          Follow Up:  No Follow-up on file.     An After Visit Summary and PPPS with all of these plans were given to the patient.

## 2018-06-11 ENCOUNTER — RESULTS ENCOUNTER (OUTPATIENT)
Dept: INTERNAL MEDICINE | Facility: CLINIC | Age: 68
End: 2018-06-11

## 2018-06-11 DIAGNOSIS — E03.9 ACQUIRED HYPOTHYROIDISM: ICD-10-CM

## 2018-06-18 LAB — TSH SERPL DL<=0.005 MIU/L-ACNC: 0.45 MIU/ML (ref 0.27–4.2)

## 2018-07-26 RX ORDER — LISINOPRIL 20 MG/1
TABLET ORAL
Qty: 90 TABLET | Refills: 3 | Status: SHIPPED | OUTPATIENT
Start: 2018-07-26 | End: 2019-05-07 | Stop reason: SDUPTHER

## 2018-08-28 ENCOUNTER — PREP FOR SURGERY (OUTPATIENT)
Dept: OTHER | Facility: HOSPITAL | Age: 68
End: 2018-08-28

## 2018-08-28 DIAGNOSIS — Z12.11 COLON CANCER SCREENING: Primary | ICD-10-CM

## 2018-08-28 DIAGNOSIS — Z80.0 FAMILY HISTORY OF COLON CANCER: ICD-10-CM

## 2018-08-30 PROBLEM — Z80.0 FAMILY HISTORY OF COLON CANCER: Status: ACTIVE | Noted: 2018-08-30

## 2018-08-30 PROBLEM — Z12.11 COLON CANCER SCREENING: Status: ACTIVE | Noted: 2018-08-30

## 2018-11-05 ENCOUNTER — ANESTHESIA EVENT (OUTPATIENT)
Dept: GASTROENTEROLOGY | Facility: HOSPITAL | Age: 68
End: 2018-11-05

## 2018-11-05 ENCOUNTER — HOSPITAL ENCOUNTER (OUTPATIENT)
Facility: HOSPITAL | Age: 68
Setting detail: HOSPITAL OUTPATIENT SURGERY
Discharge: HOME | End: 2018-11-05
Attending: SURGERY | Admitting: SURGERY

## 2018-11-05 ENCOUNTER — ANESTHESIA (OUTPATIENT)
Dept: GASTROENTEROLOGY | Facility: HOSPITAL | Age: 68
End: 2018-11-05

## 2018-11-05 VITALS
BODY MASS INDEX: 24.43 KG/M2 | SYSTOLIC BLOOD PRESSURE: 121 MMHG | DIASTOLIC BLOOD PRESSURE: 77 MMHG | TEMPERATURE: 97.9 F | RESPIRATION RATE: 16 BRPM | HEIGHT: 68 IN | OXYGEN SATURATION: 98 % | WEIGHT: 161.19 LBS | HEART RATE: 67 BPM

## 2018-11-05 PROCEDURE — G0105 COLORECTAL SCRN; HI RISK IND: HCPCS | Performed by: SURGERY

## 2018-11-05 PROCEDURE — 25010000002 PROPOFOL 10 MG/ML EMULSION: Performed by: ANESTHESIOLOGY

## 2018-11-05 PROCEDURE — 25010000002 GLUCAGON (RDNA) PER 1 MG: Performed by: SURGERY

## 2018-11-05 RX ORDER — SODIUM CHLORIDE, SODIUM LACTATE, POTASSIUM CHLORIDE, CALCIUM CHLORIDE 600; 310; 30; 20 MG/100ML; MG/100ML; MG/100ML; MG/100ML
30 INJECTION, SOLUTION INTRAVENOUS CONTINUOUS PRN
Status: DISCONTINUED | OUTPATIENT
Start: 2018-11-05 | End: 2018-11-05 | Stop reason: HOSPADM

## 2018-11-05 RX ORDER — SODIUM CHLORIDE 0.9 % (FLUSH) 0.9 %
3-10 SYRINGE (ML) INJECTION AS NEEDED
Status: DISCONTINUED | OUTPATIENT
Start: 2018-11-05 | End: 2018-11-05 | Stop reason: HOSPADM

## 2018-11-05 RX ORDER — PROPOFOL 10 MG/ML
VIAL (ML) INTRAVENOUS CONTINUOUS PRN
Status: DISCONTINUED | OUTPATIENT
Start: 2018-11-05 | End: 2018-11-05 | Stop reason: SURG

## 2018-11-05 RX ORDER — SODIUM CHLORIDE 0.9 % (FLUSH) 0.9 %
3 SYRINGE (ML) INJECTION EVERY 12 HOURS SCHEDULED
Status: DISCONTINUED | OUTPATIENT
Start: 2018-11-05 | End: 2018-11-05 | Stop reason: HOSPADM

## 2018-11-05 RX ORDER — LIDOCAINE HYDROCHLORIDE 20 MG/ML
INJECTION, SOLUTION INFILTRATION; PERINEURAL AS NEEDED
Status: DISCONTINUED | OUTPATIENT
Start: 2018-11-05 | End: 2018-11-05 | Stop reason: SURG

## 2018-11-05 RX ORDER — PROPOFOL 10 MG/ML
VIAL (ML) INTRAVENOUS AS NEEDED
Status: DISCONTINUED | OUTPATIENT
Start: 2018-11-05 | End: 2018-11-05 | Stop reason: SURG

## 2018-11-05 RX ADMIN — LIDOCAINE HYDROCHLORIDE 50 MG: 20 INJECTION, SOLUTION INFILTRATION; PERINEURAL at 08:06

## 2018-11-05 RX ADMIN — PROPOFOL 50 MG: 10 INJECTION, EMULSION INTRAVENOUS at 08:15

## 2018-11-05 RX ADMIN — PROPOFOL 75 MG: 10 INJECTION, EMULSION INTRAVENOUS at 08:11

## 2018-11-05 RX ADMIN — PROPOFOL 125 MCG/KG/MIN: 10 INJECTION, EMULSION INTRAVENOUS at 08:11

## 2018-11-05 RX ADMIN — SODIUM CHLORIDE, POTASSIUM CHLORIDE, SODIUM LACTATE AND CALCIUM CHLORIDE 30 ML/HR: 600; 310; 30; 20 INJECTION, SOLUTION INTRAVENOUS at 07:32

## 2018-11-05 NOTE — ANESTHESIA POSTPROCEDURE EVALUATION
Patient: Krys Slade    Procedure Summary     Date:  11/05/18 Room / Location:  [facility] ENDOSCOPY 5 / [facility]    Anesthesia Start:  0804 Anesthesia Stop:  0833    Procedure:  COLONOSCOPY TO CECUM (N/A ) Diagnosis:       Family history of colon cancer      Colon cancer screening      (Family history of colon cancer [Z80.0])      (Colon cancer screening [Z12.11])    Surgeon:  Yelena Mcduffie MD Provider:  Jose Jones MD    Anesthesia Type:  MAC ASA Status:  3          Anesthesia Type: MAC  Last vitals  BP   121/77 (11/05/18 0851)   Temp   36.6 °C (97.9 °F) (11/05/18 0841)   Pulse   67 (11/05/18 0851)   Resp   16 (11/05/18 0851)     SpO2   98 % (11/05/18 0851)     Post Anesthesia Care and Evaluation    Patient location during evaluation: PACU  Patient participation: complete - patient participated  Level of consciousness: awake  Pain score: 1  Pain management: adequate  Airway patency: patent  Anesthetic complications: No anesthetic complications  PONV Status: none  Cardiovascular status: acceptable  Respiratory status: acceptable  Hydration status: acceptable

## 2018-11-05 NOTE — H&P
Cc: Endoscopy Visit    HPI: 68 y.o. female here for screening c scope with brother with colon cancer, with a prior history of hyperplastic polyps only.     Past Medical History:   Diagnosis Date   • Colon polyp    • Disease of thyroid gland    • Hyperlipidemia    • Hypertension    • Lung mass    • Tonsillitis        Past Surgical History:   Procedure Laterality Date   • HYSTERECTOMY     • MOHS SURGERY     • TONSILLECTOMY         has No Known Allergies.       Medication List      ASK your doctor about these medications    calcium carbonate 600 MG tablet  Commonly known as:  OS-EMILIA     ESTRACE VAGINAL 0.1 MG/GM vaginal cream  Generic drug:  estradiol     GLUCOSAMINE 1500 COMPLEX PO     levothyroxine 75 MCG tablet  Commonly known as:  SYNTHROID  Take 1 tablet by mouth Daily.     lisinopril 20 MG tablet  Commonly known as:  PRINIVIL,ZESTRIL  TAKE 1 TABLET DAILY     psyllium 58.6 % packet  Commonly known as:  METAMUCIL     Vitamin D3 2000 units tablet          Family History   Problem Relation Age of Onset   • Heart valve disorder Mother    • Stroke Mother    • Coronary artery disease Father    • Macular degeneration Father    • Colon cancer Brother        Social History     Social History   • Marital status: Single     Spouse name: N/A   • Number of children: 2   • Years of education: N/A     Occupational History   • retired teacher      Social History Main Topics   • Smoking status: Never Smoker   • Smokeless tobacco: Never Used   • Alcohol use Yes      Comment: occassional    • Drug use: No   • Sexual activity: Defer     Other Topics Concern   • Not on file     Social History Narrative   • No narrative on file       Vitals:    11/05/18 0715   BP: 139/67   Pulse: 72   Resp: 16   Temp: 98 °F (36.7 °C)   SpO2: 95%       Body mass index is 24.51 kg/m².    Physical Exam    General: No acute distress  Lungs: No labored breathing, Pulse oximetry on room air is 95%.  Heart: RRR  Abdo: Soft  Mental:  Awake, alert, and  oriented    Imp:     · Screening  · Brother with colon cancer  · Prior history of hyperplastic polyps     Plan:  · c scope    Yelena Mcduffie MD  8:10 AM

## 2018-11-05 NOTE — OP NOTE
Colonoscopy Procedure Note  Krys Slade  1950  Date of Procedure: 11/05/18    Pre-operative Diagnosis:    · Screening  · Brother with colon cancer  · Prior history of hyperplastic polyps    Post-operative Diagnosis:  · Very poor prep    Procedure: Colonoscopy      Findings/Treatments:   · Very poor prep, with large areas of liquid opaque stool, as well as mucoid adherence to the colonic wall, unable to be completely irrigated out particularly on the right side, such that polyps less than 1 cm could be missed.  This will definitely decrease the interval for her next scope, she already being reluctant to proceed with additional, but with my recommendation of 3 years.  She will need to change the type of prep she does at the next setting, be at either completion of the one given if she did not do so this time or a separate type.       Recommendations:   · C scope 3 years  · Keep a copy of the photographs of the procedure given to you today for possible need for reference in the future.    Surgeon: Reta    Anesthetic: MAC per No responsible provider has been recorded for the case.    Indications:  As above    Scope Withdrawal Time:  6 minutes  30 seconds    Procedure Details     MAC anesthesia was induced.  The 180 Colonoscopy was inserted blindly into the rectum and advanced to the cecum, with relative ease, but with need for pressure, lift, but without or turning, due to difficulty in visualization due to the large amount of stool in the colon..  Cecum was identified by ileocecal valve and appendiceal orifice and photographed for documentation.      Prep quality was as above.  A careful inspection was made as the colonoscope was withdrawn, including a retroflexed view of the rectum; there was no suggestion of presence of angiodysplasias, visualized colitis, polyps or diverticula, with no interventions, other than dramatic amount of irrigation.      Retroflexion in the rectum revealed no  abnormalities.    Yelena Mcduffie MD  11/05/18  8:36 AM

## 2018-11-05 NOTE — ANESTHESIA PREPROCEDURE EVALUATION
Anesthesia Evaluation     Patient summary reviewed   NPO Solid Status: > 8 hours             Airway   No difficulty expected  Dental      Pulmonary    Cardiovascular     Rhythm: regular    (+) hypertension,       Neuro/Psych  GI/Hepatic/Renal/Endo    (+)   hypothyroidism,     Musculoskeletal     Abdominal    Substance History      OB/GYN          Other                        Anesthesia Plan    ASA 3     MAC     Anesthetic plan, all risks, benefits, and alternatives have been provided, discussed and informed consent has been obtained with: patient.

## 2019-04-10 ENCOUNTER — APPOINTMENT (OUTPATIENT)
Dept: WOMENS IMAGING | Facility: HOSPITAL | Age: 69
End: 2019-04-10

## 2019-04-10 PROCEDURE — 77067 SCR MAMMO BI INCL CAD: CPT | Performed by: RADIOLOGY

## 2019-04-10 PROCEDURE — 77063 BREAST TOMOSYNTHESIS BI: CPT | Performed by: RADIOLOGY

## 2019-04-19 DIAGNOSIS — I10 BENIGN ESSENTIAL HYPERTENSION: ICD-10-CM

## 2019-04-19 DIAGNOSIS — R79.89 ABNORMAL CBC: ICD-10-CM

## 2019-04-19 DIAGNOSIS — Z00.00 HEALTH CARE MAINTENANCE: Primary | ICD-10-CM

## 2019-04-19 DIAGNOSIS — E03.9 ACQUIRED HYPOTHYROIDISM: ICD-10-CM

## 2019-04-19 DIAGNOSIS — R73.03 PREDIABETES: ICD-10-CM

## 2019-04-19 DIAGNOSIS — E78.2 MIXED HYPERLIPIDEMIA: ICD-10-CM

## 2019-04-25 LAB
ALBUMIN SERPL-MCNC: 4.6 G/DL (ref 3.5–5.2)
ALBUMIN/GLOB SERPL: 2 G/DL
ALP SERPL-CCNC: 63 U/L (ref 39–117)
ALT SERPL-CCNC: 12 U/L (ref 1–33)
APPEARANCE UR: CLEAR
AST SERPL-CCNC: 12 U/L (ref 1–32)
BACTERIA #/AREA URNS HPF: NORMAL /HPF
BACTERIA UR CULT: NORMAL
BACTERIA UR CULT: NORMAL
BASOPHILS # BLD AUTO: 0.06 10*3/MM3 (ref 0–0.2)
BASOPHILS NFR BLD AUTO: 1.3 % (ref 0–1.5)
BILIRUB SERPL-MCNC: 0.3 MG/DL (ref 0.2–1.2)
BILIRUB UR QL STRIP: NEGATIVE
BUN SERPL-MCNC: 13 MG/DL (ref 8–23)
BUN/CREAT SERPL: 15.3 (ref 7–25)
CALCIUM SERPL-MCNC: 9.5 MG/DL (ref 8.6–10.5)
CHLORIDE SERPL-SCNC: 104 MMOL/L (ref 98–107)
CHOLEST SERPL-MCNC: 220 MG/DL (ref 0–200)
CO2 SERPL-SCNC: 26.7 MMOL/L (ref 22–29)
COLOR UR: YELLOW
CREAT SERPL-MCNC: 0.85 MG/DL (ref 0.57–1)
EOSINOPHIL # BLD AUTO: 0.12 10*3/MM3 (ref 0–0.4)
EOSINOPHIL NFR BLD AUTO: 2.6 % (ref 0.3–6.2)
EPI CELLS #/AREA URNS HPF: NORMAL /HPF
ERYTHROCYTE [DISTWIDTH] IN BLOOD BY AUTOMATED COUNT: 13.6 % (ref 12.3–15.4)
GLOBULIN SER CALC-MCNC: 2.3 GM/DL
GLUCOSE SERPL-MCNC: 94 MG/DL (ref 65–99)
GLUCOSE UR QL: NEGATIVE
HBA1C MFR BLD: 5.9 % (ref 4.8–5.6)
HCT VFR BLD AUTO: 40.8 % (ref 34–46.6)
HDLC SERPL-MCNC: 64 MG/DL (ref 40–60)
HGB BLD-MCNC: 12.2 G/DL (ref 12–15.9)
HGB UR QL STRIP: NEGATIVE
IMM GRANULOCYTES # BLD AUTO: 0.01 10*3/MM3 (ref 0–0.05)
IMM GRANULOCYTES NFR BLD AUTO: 0.2 % (ref 0–0.5)
KETONES UR QL STRIP: NEGATIVE
LDLC SERPL CALC-MCNC: 137 MG/DL (ref 0–100)
LEUKOCYTE ESTERASE UR QL STRIP: ABNORMAL
LYMPHOCYTES # BLD AUTO: 2.08 10*3/MM3 (ref 0.7–3.1)
LYMPHOCYTES NFR BLD AUTO: 45 % (ref 19.6–45.3)
MCH RBC QN AUTO: 30 PG (ref 26.6–33)
MCHC RBC AUTO-ENTMCNC: 29.9 G/DL (ref 31.5–35.7)
MCV RBC AUTO: 100.5 FL (ref 79–97)
MICRO URNS: ABNORMAL
MONOCYTES # BLD AUTO: 0.41 10*3/MM3 (ref 0.1–0.9)
MONOCYTES NFR BLD AUTO: 8.9 % (ref 5–12)
NEUTROPHILS # BLD AUTO: 1.94 10*3/MM3 (ref 1.7–7)
NEUTROPHILS NFR BLD AUTO: 42 % (ref 42.7–76)
NITRITE UR QL STRIP: NEGATIVE
NRBC BLD AUTO-RTO: 0 /100 WBC (ref 0–0.2)
PH UR STRIP: 7 [PH] (ref 5–7.5)
PLATELET # BLD AUTO: 232 10*3/MM3 (ref 140–450)
POTASSIUM SERPL-SCNC: 4.6 MMOL/L (ref 3.5–5.2)
PROT SERPL-MCNC: 6.9 G/DL (ref 6–8.5)
PROT UR QL STRIP: NEGATIVE
RBC # BLD AUTO: 4.06 10*6/MM3 (ref 3.77–5.28)
RBC #/AREA URNS HPF: NORMAL /HPF
SODIUM SERPL-SCNC: 140 MMOL/L (ref 136–145)
SP GR UR: 1.01 (ref 1–1.03)
TRIGL SERPL-MCNC: 95 MG/DL (ref 0–150)
TSH SERPL DL<=0.005 MIU/L-ACNC: 1.33 MIU/ML (ref 0.27–4.2)
URINALYSIS REFLEX: ABNORMAL
UROBILINOGEN UR STRIP-MCNC: 0.2 MG/DL (ref 0.2–1)
VLDLC SERPL CALC-MCNC: 19 MG/DL
WBC # BLD AUTO: 4.62 10*3/MM3 (ref 3.4–10.8)
WBC #/AREA URNS HPF: NORMAL /HPF

## 2019-05-07 ENCOUNTER — OFFICE VISIT (OUTPATIENT)
Dept: INTERNAL MEDICINE | Facility: CLINIC | Age: 69
End: 2019-05-07

## 2019-05-07 VITALS
TEMPERATURE: 98.4 F | HEART RATE: 79 BPM | SYSTOLIC BLOOD PRESSURE: 130 MMHG | WEIGHT: 162 LBS | HEIGHT: 68 IN | OXYGEN SATURATION: 96 % | DIASTOLIC BLOOD PRESSURE: 82 MMHG | BODY MASS INDEX: 24.55 KG/M2

## 2019-05-07 DIAGNOSIS — Z00.00 MEDICARE ANNUAL WELLNESS VISIT, SUBSEQUENT: ICD-10-CM

## 2019-05-07 DIAGNOSIS — Z00.00 HEALTH MAINTENANCE EXAMINATION: Primary | ICD-10-CM

## 2019-05-07 DIAGNOSIS — D75.89 MACROCYTOSIS WITHOUT ANEMIA: ICD-10-CM

## 2019-05-07 DIAGNOSIS — E78.2 MIXED HYPERLIPIDEMIA: ICD-10-CM

## 2019-05-07 DIAGNOSIS — I10 BENIGN ESSENTIAL HYPERTENSION: ICD-10-CM

## 2019-05-07 PROCEDURE — G0439 PPPS, SUBSEQ VISIT: HCPCS | Performed by: INTERNAL MEDICINE

## 2019-05-07 PROCEDURE — 99397 PER PM REEVAL EST PAT 65+ YR: CPT | Performed by: INTERNAL MEDICINE

## 2019-05-07 RX ORDER — LEVOTHYROXINE SODIUM 0.07 MG/1
75 TABLET ORAL DAILY
Qty: 90 TABLET | Refills: 1 | Status: SHIPPED | OUTPATIENT
Start: 2019-05-07 | End: 2019-10-01 | Stop reason: SDUPTHER

## 2019-05-07 RX ORDER — LISINOPRIL 20 MG/1
20 TABLET ORAL DAILY
Qty: 90 TABLET | Refills: 1 | Status: SHIPPED | OUTPATIENT
Start: 2019-05-07 | End: 2019-10-01 | Stop reason: SDUPTHER

## 2019-05-07 NOTE — PROGRESS NOTES
Medicare Annual Wellness Visit & CPE    Chief Complaint:  Annual Exam (AWV)      History of Present Illness    Krys Slade is a 69 y.o. female who presents for an Annual Wellness Visit. In addition, we addressed the following health issues:    Mammo:1/2018  DEXA: 12/2014 - Normal  C-scope: 11/5/18  Dental Exam: q6 mo  Eye Exam: yearly  Exercise: a lot    Advanced Care Planning:  Patient has an advance directive - a copy has not been provided. Have asked the patient to send this to us to add to record   Immunization History   Administered Date(s) Administered   • Flu Mist 11/07/2013, 09/16/2014, 10/15/2015   • Flu Vaccine High Dose PF 65YR+ 10/12/2016, 10/19/2017   • Pneumococcal Conjugate 13-Valent (PCV13) 04/26/2016   • Pneumococcal Polysaccharide (PPSV23) 09/16/2014   • Tdap 01/01/2012       Review of Systems   Constitution: Negative for chills, fever and malaise/fatigue.   HENT: Positive for hearing loss (hearing aids). Negative for hoarse voice and sore throat.    Eyes: Negative for blurred vision, double vision and visual disturbance.   Cardiovascular: Positive for palpitations (very sporadic). Negative for chest pain and leg swelling.   Respiratory: Negative for cough and shortness of breath.    Endocrine: Negative for polydipsia and polyuria.   Hematologic/Lymphatic: Does not bruise/bleed easily.   Skin: Negative for rash and suspicious lesions.   Musculoskeletal: Negative for arthritis and myalgias.   Gastrointestinal: Negative for bloating, abdominal pain, change in bowel habit, constipation, diarrhea, dysphagia, hematochezia, melena, nausea and vomiting.   Genitourinary: Negative for dysuria and hematuria.   Neurological: Negative for dizziness, headaches and light-headedness.   Psychiatric/Behavioral: Negative for depression. The patient is not nervous/anxious.        Past Medical History:   Diagnosis Date   • Colon polyp    • Disease of thyroid gland    • Hyperlipidemia    • Hypertension    • Lung  mass    • Tonsillitis        Past Surgical History:   Procedure Laterality Date   • COLONOSCOPY N/A 11/5/2018    Procedure: COLONOSCOPY TO CECUM;  Surgeon: Yelena Mcduffie MD;  Location: [facility] ENDOSCOPY;  Service: General   • HYSTERECTOMY     • MOHS SURGERY     • TONSILLECTOMY         Social History     Socioeconomic History   • Marital status: Single     Spouse name: Not on file   • Number of children: 2   • Years of education: Not on file   • Highest education level: Not on file   Occupational History   • Occupation: retired teacher   Tobacco Use   • Smoking status: Never Smoker   • Smokeless tobacco: Never Used   Substance and Sexual Activity   • Alcohol use: Yes     Comment: occassional    • Drug use: No   • Sexual activity: Defer       Family History   Problem Relation Age of Onset   • Heart valve disorder Mother    • Stroke Mother    • Coronary artery disease Father    • Macular degeneration Father    • Colon cancer Brother        No Known Allergies    Current Outpatient Medications on File Prior to Visit   Medication Sig Dispense Refill   • calcium carbonate (OS-EMILIA) 600 MG tablet Take 600 mg by mouth daily.     • Cholecalciferol (VITAMIN D3) 2000 UNITS tablet Take  by mouth.     • ESTRACE VAGINAL 0.1 MG/GM vaginal cream      • Glucosamine-Chondroit-Vit C-Mn (GLUCOSAMINE 1500 COMPLEX PO) Take  by mouth.     • levothyroxine (SYNTHROID, LEVOTHROID) 75 MCG tablet Take 1 tablet by mouth Daily. 90 tablet 3   • lisinopril (PRINIVIL,ZESTRIL) 20 MG tablet TAKE 1 TABLET DAILY 90 tablet 3   • psyllium (METAMUCIL) 58.6 % packet Take 1 packet by mouth daily.       No current facility-administered medications on file prior to visit.        Patient Active Problem List   Diagnosis   • Benign essential hypertension   • Hyperlipidemia   • Hypothyroidism   • Lung mass   • Prediabetes   • Abnormal CBC   • Family history of colon cancer   • Colon cancer screening       Health Risk Assessment/Depression Screen/Functional and  "Cognitive Screening:   The patient has completed a Health Risk Assessment & Depression screen. These have been reviewed with them and have been scanned as a separate documents.    Age-appropriate Screening Schedule:  Refer to the list below for future screening recommendations based on patient's age. Orders for these recommended tests are listed in the plan section. The patient has been provided with a written plan.     I have reviewed their problem list, past medical history, family history, social history, and surgical history.     Vitals:    05/07/19 1141   BP: 130/82   BP Location: Left arm   Patient Position: Sitting   Cuff Size: Adult   Pulse: 79   Temp: 98.4 °F (36.9 °C)   TempSrc: Oral   SpO2: 96%   Weight: 73.5 kg (162 lb)   Height: 172.7 cm (68\")       Body mass index is 24.63 kg/m².    Physical Exam   Constitutional: She is oriented to person, place, and time. She appears well-developed and well-nourished. No distress.   HENT:   Head: Normocephalic and atraumatic.   Nose: Nose normal.   Mouth/Throat: Oropharynx is clear and moist.   Eyes: Conjunctivae and EOM are normal. Pupils are equal, round, and reactive to light. No scleral icterus.   Neck: Normal range of motion. Neck supple. No thyromegaly present.   Cardiovascular: Normal rate, regular rhythm and normal heart sounds. Exam reveals no gallop and no friction rub.   No murmur heard.  Pulses:       Carotid pulses are 2+ on the right side, and 2+ on the left side.       Femoral pulses are 2+ on the right side, and 2+ on the left side.       Dorsalis pedis pulses are 2+ on the right side, and 2+ on the left side.        Posterior tibial pulses are 2+ on the right side, and 2+ on the left side.   Pulmonary/Chest: Effort normal and breath sounds normal. No respiratory distress. She has no wheezes. She has no rales. Right breast exhibits no mass and no nipple discharge. Left breast exhibits no mass and no nipple discharge.   Abdominal: Soft. Bowel sounds " are normal. She exhibits no distension and no mass. There is no tenderness.   Musculoskeletal: Normal range of motion. She exhibits no edema.     Vascular Status -  Her right foot exhibits no edema. Her left foot exhibits no edema.  Skin Integrity  -  Her right foot skin is intact.Her left foot skin is intact..  Lymphadenopathy:     She has no cervical adenopathy.     She has no axillary adenopathy.        Right: No inguinal and no supraclavicular adenopathy present.        Left: No inguinal and no supraclavicular adenopathy present.   Neurological: She is alert and oriented to person, place, and time. She has normal reflexes. No cranial nerve deficit.   Skin: Skin is warm and dry.   Psychiatric: She has a normal mood and affect. Her speech is normal and behavior is normal. Judgment and thought content normal. Cognition and memory are normal.   Vitals reviewed.      Results for orders placed or performed in visit on 04/19/19   Urine culture, Comprehensive - ,   Result Value Ref Range    Urine Culture Final report     Result 1 Comment    Comprehensive Metabolic Panel   Result Value Ref Range    Glucose 94 65 - 99 mg/dL    BUN 13 8 - 23 mg/dL    Creatinine 0.85 0.57 - 1.00 mg/dL    eGFR Non African Am 66 >60 mL/min/1.73    eGFR African Am 80 >60 mL/min/1.73    BUN/Creatinine Ratio 15.3 7.0 - 25.0    Sodium 140 136 - 145 mmol/L    Potassium 4.6 3.5 - 5.2 mmol/L    Chloride 104 98 - 107 mmol/L    Total CO2 26.7 22.0 - 29.0 mmol/L    Calcium 9.5 8.6 - 10.5 mg/dL    Total Protein 6.9 6.0 - 8.5 g/dL    Albumin 4.60 3.50 - 5.20 g/dL    Globulin 2.3 gm/dL    A/G Ratio 2.0 g/dL    Total Bilirubin 0.3 0.2 - 1.2 mg/dL    Alkaline Phosphatase 63 39 - 117 U/L    AST (SGOT) 12 1 - 32 U/L    ALT (SGPT) 12 1 - 33 U/L   Lipid Panel   Result Value Ref Range    Total Cholesterol 220 (H) 0 - 200 mg/dL    Triglycerides 95 0 - 150 mg/dL    HDL Cholesterol 64 (H) 40 - 60 mg/dL    VLDL Cholesterol 19 mg/dL    LDL Cholesterol  137 (H) 0 -  100 mg/dL   TSH Rfx On Abnormal To Free T4   Result Value Ref Range    TSH 1.330 0.270 - 4.200 mIU/mL   Hemoglobin A1c   Result Value Ref Range    Hemoglobin A1C 5.90 (H) 4.80 - 5.60 %   Urinalysis With Culture If Indicated - Urine, Clean Catch   Result Value Ref Range    Specific Gravity, UA 1.006 1.005 - 1.030    pH, UA 7.0 5.0 - 7.5    Color, UA Yellow Yellow    Appearance, UA Clear Clear    Leukocytes, UA 1+ (A) Negative    Protein Negative Negative/Trace    Glucose, UA Negative Negative    Ketones Negative Negative    Blood, UA Negative Negative    Bilirubin, UA Negative Negative    Urobilinogen, UA 0.2 0.2 - 1.0 mg/dL    Nitrite, UA Negative Negative    Microscopic Examination See below:     Urinalysis Reflex Comment    Microscopic Examination -   Result Value Ref Range    WBC, UA 0-5 0 - 5 /hpf    RBC, UA 0-2 0 - 2 /hpf    Epithelial Cells (non renal) 0-10 0 - 10 /hpf    Bacteria, UA None seen None seen/Few /hpf   CBC & Differential   Result Value Ref Range    WBC 4.62 3.40 - 10.80 10*3/mm3    RBC 4.06 3.77 - 5.28 10*6/mm3    Hemoglobin 12.2 12.0 - 15.9 g/dL    Hematocrit 40.8 34.0 - 46.6 %    .5 (H) 79.0 - 97.0 fL    MCH 30.0 26.6 - 33.0 pg    MCHC 29.9 (L) 31.5 - 35.7 g/dL    RDW 13.6 12.3 - 15.4 %    Platelets 232 140 - 450 10*3/mm3    Neutrophil Rel % 42.0 (L) 42.7 - 76.0 %    Lymphocyte Rel % 45.0 19.6 - 45.3 %    Monocyte Rel % 8.9 5.0 - 12.0 %    Eosinophil Rel % 2.6 0.3 - 6.2 %    Basophil Rel % 1.3 0.0 - 1.5 %    Neutrophils Absolute 1.94 1.70 - 7.00 10*3/mm3    Lymphocytes Absolute 2.08 0.70 - 3.10 10*3/mm3    Monocytes Absolute 0.41 0.10 - 0.90 10*3/mm3    Eosinophils Absolute 0.12 0.00 - 0.40 10*3/mm3    Basophils Absolute 0.06 0.00 - 0.20 10*3/mm3    Immature Granulocyte Rel % 0.2 0.0 - 0.5 %    Immature Grans Absolute 0.01 0.00 - 0.05 10*3/mm3    nRBC 0.0 0.0 - 0.2 /100 WBC       Assessment & Plan:    Diagnoses and all orders for this visit:    Health maintenance examination    Medicare  annual wellness visit, subsequent    Benign essential hypertension    Mixed hyperlipidemia    Macrocytosis without anemia  -     Vitamin B12        Discussion/Summary  Krys is here for AWV and CPE.  She is up to date on all health maintenance except shingrix.  She will look in to this at her local pharmacy.  Her bp is controlled.  Thyroid is well controlled.  Lipids are controlled.  Her mcv is a bit elevated.  We will check a b12 today.  If normal, will continue to monitor with yearly labs.  Continue all current meds.  Continue regular exercise.        Follow Up:  No Follow-up on file.     An After Visit Summary and PPPS with all of these plans were given to the patient.

## 2019-05-07 NOTE — PATIENT INSTRUCTIONS
Medicare Wellness  Personal Prevention Plan of Service     Date of Office Visit:  2019  Encounter Provider:  Quynh Munoz MD  Place of Service:  [facility] INTERNAL MEDICINE  Patient Name: Krys Slade  :  1950    As part of the Medicare Wellness portion of your visit today, we are providing you with this personalized preventive plan of services (PPPS). This plan is based upon recommendations of the [place] Preventive Services Task Force (USPSTF) and the Advisory Committee on Immunization Practices (ACIP).    This lists the preventive care services that should be considered, and provides dates of when you are due. Items listed as completed are up-to-date and do not require any further intervention.    Health Maintenance   Topic Date Due   • ZOSTER VACCINE (2 of 2) 2010   • MAMMOGRAM  2019   • MEDICARE ANNUAL WELLNESS  2019   • INFLUENZA VACCINE  2019   • LIPID PANEL  2020   • TDAP/TD VACCINES (2 - Td) 2022   • COLONOSCOPY  2028   • HEPATITIS C SCREENING  Completed   • PNEUMOCOCCAL VACCINES (65+ LOW/MEDIUM RISK)  Completed       Orders Placed This Encounter   Procedures   • Vitamin B12       No Follow-up on file.

## 2019-05-08 LAB — VIT B12 SERPL-MCNC: 693 PG/ML (ref 211–946)

## 2019-10-01 ENCOUNTER — OFFICE VISIT (OUTPATIENT)
Dept: INTERNAL MEDICINE | Facility: CLINIC | Age: 69
End: 2019-10-01

## 2019-10-01 VITALS
DIASTOLIC BLOOD PRESSURE: 70 MMHG | SYSTOLIC BLOOD PRESSURE: 140 MMHG | BODY MASS INDEX: 24.71 KG/M2 | HEIGHT: 68 IN | WEIGHT: 163 LBS

## 2019-10-01 DIAGNOSIS — E03.9 ACQUIRED HYPOTHYROIDISM: ICD-10-CM

## 2019-10-01 DIAGNOSIS — I10 BENIGN ESSENTIAL HYPERTENSION: Primary | ICD-10-CM

## 2019-10-01 DIAGNOSIS — Z23 NEEDS FLU SHOT: Primary | ICD-10-CM

## 2019-10-01 DIAGNOSIS — R73.03 PREDIABETES: ICD-10-CM

## 2019-10-01 PROCEDURE — G0008 ADMIN INFLUENZA VIRUS VAC: HCPCS | Performed by: PHYSICIAN ASSISTANT

## 2019-10-01 PROCEDURE — 99213 OFFICE O/P EST LOW 20 MIN: CPT | Performed by: PHYSICIAN ASSISTANT

## 2019-10-01 PROCEDURE — 90653 IIV ADJUVANT VACCINE IM: CPT | Performed by: PHYSICIAN ASSISTANT

## 2019-10-01 RX ORDER — LEVOTHYROXINE SODIUM 0.07 MG/1
75 TABLET ORAL DAILY
Qty: 90 TABLET | Refills: 3 | Status: SHIPPED | OUTPATIENT
Start: 2019-10-01 | End: 2020-10-14

## 2019-10-01 RX ORDER — LISINOPRIL 20 MG/1
20 TABLET ORAL DAILY
Qty: 90 TABLET | Refills: 1 | Status: SHIPPED | OUTPATIENT
Start: 2019-10-01 | End: 2020-07-02

## 2019-10-01 NOTE — PROGRESS NOTES
Subjective   Chief Complaint   Patient presents with   • Establish Care   • Hypothyroidism   • Hypertension       History of Present Illness     Pt is a 69 yr old white female hx of hypothyroidism, hld, prediabetes and htn who presents today to establish care as a new patient. She is a former pt of Dr. Munoz. She had her annual wellness with CPE earlier this year. She is up to date on all screening. Her mammogram is scheduled for later in the week. She needs flu shot today. No cp, soa, headaches or palpitations. She feels great.      Patient Active Problem List   Diagnosis   • Benign essential hypertension   • Hyperlipidemia   • Hypothyroidism   • Lung mass   • Prediabetes   • Abnormal CBC   • Family history of colon cancer   • Colon cancer screening       No Known Allergies    Current Outpatient Medications on File Prior to Visit   Medication Sig Dispense Refill   • calcium carbonate (OS-EMILIA) 600 MG tablet Take 600 mg by mouth daily.     • Cholecalciferol (VITAMIN D3) 2000 UNITS tablet Take  by mouth.     • ESTRACE VAGINAL 0.1 MG/GM vaginal cream      • Glucosamine-Chondroit-Vit C-Mn (GLUCOSAMINE 1500 COMPLEX PO) Take  by mouth.     • psyllium (METAMUCIL) 58.6 % packet Take 1 packet by mouth daily.     • [DISCONTINUED] levothyroxine (SYNTHROID, LEVOTHROID) 75 MCG tablet Take 1 tablet by mouth Daily. 90 tablet 1   • [DISCONTINUED] lisinopril (PRINIVIL,ZESTRIL) 20 MG tablet Take 1 tablet by mouth Daily. 90 tablet 1     No current facility-administered medications on file prior to visit.        Past Medical History:   Diagnosis Date   • Colon polyp    • Disease of thyroid gland    • Hyperlipidemia    • Hypertension    • Lung mass    • Tonsillitis        Family History   Problem Relation Age of Onset   • Heart valve disorder Mother    • Stroke Mother    • Coronary artery disease Father    • Macular degeneration Father    • Colon cancer Brother        Social History     Socioeconomic History   • Marital status: Single  "    Spouse name: Not on file   • Number of children: 2   • Years of education: Not on file   • Highest education level: Not on file   Occupational History   • Occupation: retired teacher   Tobacco Use   • Smoking status: Never Smoker   • Smokeless tobacco: Never Used   Substance and Sexual Activity   • Alcohol use: Yes     Comment: occassional    • Drug use: No   • Sexual activity: Defer       Past Surgical History:   Procedure Laterality Date   • COLONOSCOPY N/A 11/5/2018    Procedure: COLONOSCOPY TO CECUM;  Surgeon: Yelena Mcduffie MD;  Location: [facility] ENDOSCOPY;  Service: General   • HYSTERECTOMY     • MOHS SURGERY     • TONSILLECTOMY         The following portions of the patient's history were reviewed and updated as appropriate: problem list, allergies, current medications, past medical history, past family history, past social history and past surgical history.    Review of Systems   Cardiovascular: Negative for chest pain, dyspnea on exertion and palpitations.   Gastrointestinal: Negative for constipation and diarrhea.       Immunization History   Administered Date(s) Administered   • Flu Mist 11/07/2013, 09/16/2014, 10/15/2015   • Flu Vaccine High Dose PF 65YR+ 10/12/2016, 10/19/2017   • Fluad Quad 10/01/2019   • Pneumococcal Conjugate 13-Valent (PCV13) 04/26/2016   • Pneumococcal Polysaccharide (PPSV23) 09/16/2014   • Tdap 01/01/2012       Objective   Vitals:    10/01/19 0943 10/01/19 1003   BP:  140/70   Weight: 73.9 kg (163 lb)    Height: 172.7 cm (67.99\")      Physical Exam   Constitutional: She appears well-developed and well-nourished.   HENT:   Head: Normocephalic and atraumatic.   Eyes: Conjunctivae and EOM are normal. Pupils are equal, round, and reactive to light.   Neck: Carotid bruit is not present.   Cardiovascular: Normal rate, regular rhythm and normal heart sounds.   No murmur heard.  Pulmonary/Chest: Effort normal and breath sounds normal.   Abdominal: Soft. Bowel sounds are normal. She " exhibits no distension. There is no tenderness.   Skin: Skin is warm and dry.   Psychiatric: She has a normal mood and affect. Her behavior is normal. Judgment and thought content normal.   Vitals reviewed.        Assessment/Plan   Krys was seen today for establish care, hypothyroidism and hypertension.    Diagnoses and all orders for this visit:    Benign essential hypertension    Acquired hypothyroidism    Prediabetes    Other orders  -     levothyroxine (SYNTHROID, LEVOTHROID) 75 MCG tablet; Take 1 tablet by mouth Daily. Do not substitute, branded only.  -     lisinopril (PRINIVIL,ZESTRIL) 20 MG tablet; Take 1 tablet by mouth Daily.    Her BP is elevated today she is going to check daily over the next 4 weeks and bring her log as well as her BP machine with her and we will recheck.     Flu shot today.       Return in about 4 weeks (around 10/29/2019) for BP check.

## 2019-10-29 ENCOUNTER — OFFICE VISIT (OUTPATIENT)
Dept: INTERNAL MEDICINE | Facility: CLINIC | Age: 69
End: 2019-10-29

## 2019-10-29 VITALS
SYSTOLIC BLOOD PRESSURE: 148 MMHG | WEIGHT: 163 LBS | BODY MASS INDEX: 24.71 KG/M2 | DIASTOLIC BLOOD PRESSURE: 83 MMHG | HEIGHT: 68 IN

## 2019-10-29 DIAGNOSIS — I10 BENIGN ESSENTIAL HYPERTENSION: Primary | ICD-10-CM

## 2019-10-29 PROCEDURE — 99212 OFFICE O/P EST SF 10 MIN: CPT | Performed by: PHYSICIAN ASSISTANT

## 2020-05-22 DIAGNOSIS — I10 BENIGN ESSENTIAL HYPERTENSION: ICD-10-CM

## 2020-05-22 DIAGNOSIS — E03.9 ACQUIRED HYPOTHYROIDISM: Primary | ICD-10-CM

## 2020-05-22 DIAGNOSIS — E78.2 MIXED HYPERLIPIDEMIA: ICD-10-CM

## 2020-05-22 DIAGNOSIS — E55.9 VITAMIN D DEFICIENCY: ICD-10-CM

## 2020-05-22 DIAGNOSIS — R79.89 ABNORMAL CBC: ICD-10-CM

## 2020-05-22 DIAGNOSIS — R73.03 PREDIABETES: ICD-10-CM

## 2020-05-25 LAB
25(OH)D3+25(OH)D2 SERPL-MCNC: 68.4 NG/ML (ref 30–100)
ALBUMIN SERPL-MCNC: 4.6 G/DL (ref 3.5–5.2)
ALBUMIN/GLOB SERPL: 2.2 G/DL
ALP SERPL-CCNC: 62 U/L (ref 39–117)
ALT SERPL-CCNC: 10 U/L (ref 1–33)
APPEARANCE UR: CLEAR
AST SERPL-CCNC: 8 U/L (ref 1–32)
BACTERIA #/AREA URNS HPF: NORMAL /HPF
BACTERIA UR CULT: NORMAL
BACTERIA UR CULT: NORMAL
BASOPHILS # BLD AUTO: 0.04 10*3/MM3 (ref 0–0.2)
BASOPHILS NFR BLD AUTO: 0.8 % (ref 0–1.5)
BILIRUB SERPL-MCNC: 0.4 MG/DL (ref 0.2–1.2)
BILIRUB UR QL STRIP: NEGATIVE
BUN SERPL-MCNC: 18 MG/DL (ref 8–23)
BUN/CREAT SERPL: 20.9 (ref 7–25)
CALCIUM SERPL-MCNC: 9.5 MG/DL (ref 8.6–10.5)
CHLORIDE SERPL-SCNC: 102 MMOL/L (ref 98–107)
CHOLEST SERPL-MCNC: 206 MG/DL (ref 0–200)
CHOLEST/HDLC SERPL: 3.32 {RATIO}
CO2 SERPL-SCNC: 29.8 MMOL/L (ref 22–29)
COLOR UR: YELLOW
CREAT SERPL-MCNC: 0.86 MG/DL (ref 0.57–1)
EOSINOPHIL # BLD AUTO: 0.08 10*3/MM3 (ref 0–0.4)
EOSINOPHIL NFR BLD AUTO: 1.6 % (ref 0.3–6.2)
EPI CELLS #/AREA URNS HPF: NORMAL /HPF (ref 0–10)
ERYTHROCYTE [DISTWIDTH] IN BLOOD BY AUTOMATED COUNT: 13.2 % (ref 12.3–15.4)
GLOBULIN SER CALC-MCNC: 2.1 GM/DL
GLUCOSE SERPL-MCNC: 95 MG/DL (ref 65–99)
GLUCOSE UR QL: NEGATIVE
HBA1C MFR BLD: 6.09 % (ref 4.8–5.6)
HCT VFR BLD AUTO: 37.9 % (ref 34–46.6)
HDLC SERPL-MCNC: 62 MG/DL (ref 40–60)
HGB BLD-MCNC: 12.4 G/DL (ref 12–15.9)
HGB UR QL STRIP: NEGATIVE
IMM GRANULOCYTES # BLD AUTO: 0.01 10*3/MM3 (ref 0–0.05)
IMM GRANULOCYTES NFR BLD AUTO: 0.2 % (ref 0–0.5)
KETONES UR QL STRIP: NEGATIVE
LDLC SERPL CALC-MCNC: 128 MG/DL (ref 0–100)
LEUKOCYTE ESTERASE UR QL STRIP: ABNORMAL
LYMPHOCYTES # BLD AUTO: 2.14 10*3/MM3 (ref 0.7–3.1)
LYMPHOCYTES NFR BLD AUTO: 42 % (ref 19.6–45.3)
MCH RBC QN AUTO: 30.6 PG (ref 26.6–33)
MCHC RBC AUTO-ENTMCNC: 32.7 G/DL (ref 31.5–35.7)
MCV RBC AUTO: 93.6 FL (ref 79–97)
MICRO URNS: ABNORMAL
MONOCYTES # BLD AUTO: 0.4 10*3/MM3 (ref 0.1–0.9)
MONOCYTES NFR BLD AUTO: 7.8 % (ref 5–12)
NEUTROPHILS # BLD AUTO: 2.43 10*3/MM3 (ref 1.7–7)
NEUTROPHILS NFR BLD AUTO: 47.6 % (ref 42.7–76)
NITRITE UR QL STRIP: NEGATIVE
NRBC BLD AUTO-RTO: 0 /100 WBC (ref 0–0.2)
PH UR STRIP: 6.5 [PH] (ref 5–7.5)
PLATELET # BLD AUTO: 256 10*3/MM3 (ref 140–450)
POTASSIUM SERPL-SCNC: 4.6 MMOL/L (ref 3.5–5.2)
PROT SERPL-MCNC: 6.7 G/DL (ref 6–8.5)
PROT UR QL STRIP: NEGATIVE
RBC # BLD AUTO: 4.05 10*6/MM3 (ref 3.77–5.28)
RBC #/AREA URNS HPF: NORMAL /HPF (ref 0–2)
SODIUM SERPL-SCNC: 141 MMOL/L (ref 136–145)
SP GR UR: 1.01 (ref 1–1.03)
TRIGL SERPL-MCNC: 82 MG/DL (ref 0–150)
TSH SERPL DL<=0.005 MIU/L-ACNC: 1.05 UIU/ML (ref 0.27–4.2)
URINALYSIS REFLEX: ABNORMAL
UROBILINOGEN UR STRIP-MCNC: 0.2 MG/DL (ref 0.2–1)
VIT B12 SERPL-MCNC: 556 PG/ML (ref 211–946)
VLDLC SERPL CALC-MCNC: 16.4 MG/DL (ref 5–40)
WBC # BLD AUTO: 5.1 10*3/MM3 (ref 3.4–10.8)
WBC #/AREA URNS HPF: NORMAL /HPF (ref 0–5)

## 2020-05-27 ENCOUNTER — TELEPHONE (OUTPATIENT)
Dept: INTERNAL MEDICINE | Facility: CLINIC | Age: 70
End: 2020-05-27

## 2020-05-27 NOTE — TELEPHONE ENCOUNTER
PATIENT CALLED ABOUT HER APPT ON 05/29 @ 8 AM WITH [facility]  STATED WAS ABLE TO CHECK HER LABS AND THEY LOOK GOOD, BP IS GOOD, IF STILL NEEDS TO COME IN TO THE OFFICE. HAS BEEN TRYING TO STAY IN DUE TO COVID19    PLEASE ADVISE

## 2020-05-29 ENCOUNTER — OFFICE VISIT (OUTPATIENT)
Dept: INTERNAL MEDICINE | Facility: CLINIC | Age: 70
End: 2020-05-29

## 2020-05-29 VITALS
HEIGHT: 68 IN | SYSTOLIC BLOOD PRESSURE: 128 MMHG | WEIGHT: 163 LBS | TEMPERATURE: 97.3 F | DIASTOLIC BLOOD PRESSURE: 70 MMHG | BODY MASS INDEX: 24.71 KG/M2

## 2020-05-29 DIAGNOSIS — R73.03 PREDIABETES: ICD-10-CM

## 2020-05-29 DIAGNOSIS — E78.2 MIXED HYPERLIPIDEMIA: Primary | ICD-10-CM

## 2020-05-29 DIAGNOSIS — I10 BENIGN ESSENTIAL HYPERTENSION: ICD-10-CM

## 2020-05-29 DIAGNOSIS — E03.9 ACQUIRED HYPOTHYROIDISM: ICD-10-CM

## 2020-05-29 PROCEDURE — G0439 PPPS, SUBSEQ VISIT: HCPCS | Performed by: PHYSICIAN ASSISTANT

## 2020-05-29 NOTE — PROGRESS NOTES
The ABCs of the Annual Wellness Visit  Subsequent Medicare Wellness Visit    Chief Complaint   Patient presents with   • Medicare Wellness-subsequent       Subjective   History of Present Illness:  Krys Slade is a 70 y.o. female who presents for a Subsequent Medicare Wellness Visit.  She is doing very well. No chest pain, SOA, TONY, or palpitations. No lower extremity edema. No changes in her bowels. She has rescheduled her mammogram with Dr. Russell for the next few months. She is up to date on her vaccinations. She has had no falls. She denies depression, anxiety and insomnia.     HEALTH RISK ASSESSMENT    Recent Hospitalizations:  No hospitalization(s) within the last year.    Current Medical Providers:  Patient Care Team:  Swetha Britt PA-C as PCP - General (Physician Assistant)  Nguyễn Cardona MD PhD as Consulting Physician (Hematology and Oncology)  Taylor Russell MD as Consulting Physician (Obstetrics and Gynecology)    Smoking Status:  Social History     Tobacco Use   Smoking Status Never Smoker   Smokeless Tobacco Never Used       Alcohol Consumption:  Social History     Substance and Sexual Activity   Alcohol Use Yes    Comment: occassional        Depression Screen:   PHQ-2/PHQ-9 Depression Screening 5/29/2020   Little interest or pleasure in doing things 0   Feeling down, depressed, or hopeless 0   Trouble falling or staying asleep, or sleeping too much -   Feeling tired or having little energy -   Poor appetite or overeating -   Feeling bad about yourself - or that you are a failure or have let yourself or your family down -   Trouble concentrating on things, such as reading the newspaper or watching television -   Moving or speaking so slowly that other people could have noticed. Or the opposite - being so fidgety or restless that you have been moving around a lot more than usual -   Thoughts that you would be better off dead, or of hurting yourself in some way -   Total Score 0   If you checked  off any problems, how difficult have these problems made it for you to do your work, take care of things at home, or get along with other people? -       Fall Risk Screen:  AASHISH Fall Risk Assessment was completed, and patient is at LOW risk for falls.Assessment completed on:5/29/2020    Health Habits and Functional and Cognitive Screening:  Functional & Cognitive Status 5/29/2020   Do you have difficulty preparing food and eating? No   Do you have difficulty bathing yourself, getting dressed or grooming yourself? No   Do you have difficulty using the toilet? No   Do you have difficulty moving around from place to place? No   Do you have trouble with steps or getting out of a bed or a chair? No   Current Diet Well Balanced Diet   Dental Exam Up to date   Eye Exam Up to date   Exercise (times per week) 5 times per week   Current Exercise Activities Include Yard Work   Do you need help using the phone?  No   Are you deaf or do you have serious difficulty hearing?  No   Do you need help with transportation? No   Do you need help shopping? No   Do you need help preparing meals?  No   Do you need help with housework?  No   Do you need help with laundry? No   Do you need help taking your medications? No   Do you need help managing money? No   Do you ever drive or ride in a car without wearing a seat belt? No   Have you felt unusual stress, anger or loneliness in the last month? No   Who do you live with? Spouse   If you need help, do you have trouble finding someone available to you? No   Have you been bothered in the last four weeks by sexual problems? No   Do you have difficulty concentrating, remembering or making decisions? No         Does the patient have evidence of cognitive impairment? No    Asprin use counseling:Does not need ASA (and currently is not on it)    Age-appropriate Screening Schedule:  Refer to the list below for future screening recommendations based on patient's age, sex and/or medical conditions.  Orders for these recommended tests are listed in the plan section. The patient has been provided with a written plan.    Health Maintenance   Topic Date Due   • MAMMOGRAM  04/10/2020   • INFLUENZA VACCINE  08/01/2020   • LIPID PANEL  05/22/2021   • TDAP/TD VACCINES (2 - Td) 01/01/2022   • COLONOSCOPY  11/05/2028   • ZOSTER VACCINE  Completed          The following portions of the patient's history were reviewed and updated as appropriate: allergies, current medications, past family history, past medical history, past social history, past surgical history and problem list.    Outpatient Medications Prior to Visit   Medication Sig Dispense Refill   • calcium carbonate (OS-EMILIA) 600 MG tablet Take 600 mg by mouth daily.     • Cholecalciferol (VITAMIN D3) 2000 UNITS tablet Take  by mouth.     • ESTRACE VAGINAL 0.1 MG/GM vaginal cream      • Glucosamine-Chondroit-Vit C-Mn (GLUCOSAMINE 1500 COMPLEX PO) Take  by mouth.     • levothyroxine (SYNTHROID, LEVOTHROID) 75 MCG tablet Take 1 tablet by mouth Daily. Do not substitute, branded only. 90 tablet 3   • lisinopril (PRINIVIL,ZESTRIL) 20 MG tablet Take 1 tablet by mouth Daily. 90 tablet 1   • psyllium (METAMUCIL) 58.6 % packet Take 1 packet by mouth daily.       No facility-administered medications prior to visit.        Patient Active Problem List   Diagnosis   • Benign essential hypertension   • Hyperlipidemia   • Hypothyroidism   • Lung mass   • Prediabetes   • Abnormal CBC   • Family history of colon cancer   • Colon cancer screening       Advanced Care Planning:  ACP discussion was held with the patient during this visit. Patient has an advance directive (not in EMR), copy requested.    Review of Systems    Compared to one year ago, the patient feels her physical health is the same.  Compared to one year ago, the patient feels her mental health is the same.    Reviewed chart for potential of high risk medication in the elderly: yes  Reviewed chart for potential of  "harmful drug interactions in the elderly:yes    Objective         Vitals:    05/29/20 0800 05/29/20 0841   BP:  128/70   Temp: 97.3 °F (36.3 °C)    Weight: 73.9 kg (163 lb)    Height: 172.7 cm (67.99\")        Body mass index is 24.79 kg/m².  Discussed the patient's BMI with her. The BMI is in the acceptable range.    Physical Exam   Constitutional: She appears well-developed and well-nourished.   HENT:   Head: Normocephalic and atraumatic.   Eyes: Pupils are equal, round, and reactive to light. Conjunctivae and EOM are normal.   Neck: Neck supple. Carotid bruit is not present. No thyromegaly present.   Cardiovascular: Normal rate, regular rhythm and normal heart sounds.   Pulmonary/Chest: Effort normal and breath sounds normal.   Vitals reviewed.      Lab Results   Component Value Date    GLU 95 05/22/2020    CHLPL 206 (H) 05/22/2020    TRIG 82 05/22/2020    HDL 62 (H) 05/22/2020     (H) 05/22/2020    VLDL 16.4 05/22/2020    HGBA1C 6.09 (H) 05/22/2020        Assessment/Plan   Medicare Risks and Personalized Health Plan  CMS Preventative Services Quick Reference  Advance Directive Discussion  Breast Cancer/Mammogram Screening    The above risks/problems have been discussed with the patient.  Pertinent information has been shared with the patient in the After Visit Summary.  Follow up plans and orders are seen below in the Assessment/Plan Section.    Diagnoses and all orders for this visit:    1. Mixed hyperlipidemia (Primary)    2. Acquired hypothyroidism  -     TSH; Future    3. Prediabetes  -     Hemoglobin A1c; Future  -     Comprehensive Metabolic Panel; Future    4. Benign essential hypertension      1. HTN: BP well controlled.     2. Hypothyroidism: TSH dose adequate.     3. Prediabetes: A1c is 6.09 discussed decreasing carbohydrates and sugar consumption. She is very active.     4. Healthcare Maintenance: Vaccinations are utd. Repeat cscope in 2023. Will have mammogram with Dr. Russell. She is going to " look at her Advanced directive and see if she would like to update it, then provide a copy to have on file.     Follow Up:  Return in about 6 months (around 11/29/2020) for Lab Appt Before FUP.     An After Visit Summary and PPPS were given to the patient.

## 2020-05-29 NOTE — PROGRESS NOTES
Subjective   Chief Complaint   Patient presents with   • Annual Exam       History of Present Illness      Patient Active Problem List   Diagnosis   • Benign essential hypertension   • Hyperlipidemia   • Hypothyroidism   • Lung mass   • Prediabetes   • Abnormal CBC   • Family history of colon cancer   • Colon cancer screening       No Known Allergies    Current Outpatient Medications on File Prior to Visit   Medication Sig Dispense Refill   • calcium carbonate (OS-EMILIA) 600 MG tablet Take 600 mg by mouth daily.     • Cholecalciferol (VITAMIN D3) 2000 UNITS tablet Take  by mouth.     • ESTRACE VAGINAL 0.1 MG/GM vaginal cream      • Glucosamine-Chondroit-Vit C-Mn (GLUCOSAMINE 1500 COMPLEX PO) Take  by mouth.     • levothyroxine (SYNTHROID, LEVOTHROID) 75 MCG tablet Take 1 tablet by mouth Daily. Do not substitute, branded only. 90 tablet 3   • lisinopril (PRINIVIL,ZESTRIL) 20 MG tablet Take 1 tablet by mouth Daily. 90 tablet 1   • psyllium (METAMUCIL) 58.6 % packet Take 1 packet by mouth daily.       No current facility-administered medications on file prior to visit.        Past Medical History:   Diagnosis Date   • Colon polyp    • Disease of thyroid gland    • Hyperlipidemia    • Hypertension    • Lung mass    • Tonsillitis        Family History   Problem Relation Age of Onset   • Heart valve disorder Mother    • Stroke Mother    • Coronary artery disease Father    • Macular degeneration Father    • Colon cancer Brother        Social History     Socioeconomic History   • Marital status:      Spouse name: Not on file   • Number of children: 2   • Years of education: Not on file   • Highest education level: Not on file   Occupational History   • Occupation: retired teacher   Tobacco Use   • Smoking status: Never Smoker   • Smokeless tobacco: Never Used   Substance and Sexual Activity   • Alcohol use: Yes     Comment: occassional    • Drug use: No   • Sexual activity: Defer       Past Surgical History:  "  Procedure Laterality Date   • COLONOSCOPY N/A 11/5/2018    Procedure: COLONOSCOPY TO CECUM;  Surgeon: Yelena Mcduffie MD;  Location: [facility] ENDOSCOPY;  Service: General   • HYSTERECTOMY     • MOHS SURGERY     • TONSILLECTOMY           The following portions of the patient's history were reviewed and updated as appropriate: {history reviewed:99748::\"problem list\",\"allergies\",\"current medications\",\"past medical history\",\"past family history\",\"past social history\",\"past surgical history\"}.    ROS    Immunization History   Administered Date(s) Administered   • Flu Mist 11/07/2013, 09/16/2014, 10/15/2015   • Fluad Quad 10/01/2019   • Fluzone High Dose =>65 Years (Vaxcare ONLY) 10/12/2016, 10/19/2017   • Pneumococcal Conjugate 13-Valent (PCV13) 04/26/2016   • Pneumococcal Polysaccharide (PPSV23) 09/16/2014   • Shingrix 10/27/2019   • Tdap 01/01/2012       Objective   Vitals:    05/29/20 0800   Temp: 97.3 °F (36.3 °C)   Weight: 73.9 kg (163 lb)   Height: 172.7 cm (67.99\")     Body mass index is 24.79 kg/m².  Physical Exam      Assessment/Plan   {Assess/PlanSmartLinks:83792}    No follow-ups on file.           "

## 2020-06-10 ENCOUNTER — APPOINTMENT (OUTPATIENT)
Dept: WOMENS IMAGING | Facility: HOSPITAL | Age: 70
End: 2020-06-10

## 2020-06-10 PROCEDURE — 77063 BREAST TOMOSYNTHESIS BI: CPT | Performed by: RADIOLOGY

## 2020-06-10 PROCEDURE — 77067 SCR MAMMO BI INCL CAD: CPT | Performed by: RADIOLOGY

## 2020-07-02 RX ORDER — LISINOPRIL 20 MG/1
TABLET ORAL
Qty: 90 TABLET | Refills: 3 | Status: SHIPPED | OUTPATIENT
Start: 2020-07-02 | End: 2020-10-27

## 2020-09-18 ENCOUNTER — OFFICE VISIT (OUTPATIENT)
Dept: INTERNAL MEDICINE | Facility: CLINIC | Age: 70
End: 2020-09-18

## 2020-09-18 VITALS
HEIGHT: 68 IN | DIASTOLIC BLOOD PRESSURE: 84 MMHG | BODY MASS INDEX: 24.4 KG/M2 | SYSTOLIC BLOOD PRESSURE: 140 MMHG | TEMPERATURE: 98.4 F | WEIGHT: 161 LBS

## 2020-09-18 DIAGNOSIS — E03.9 ACQUIRED HYPOTHYROIDISM: ICD-10-CM

## 2020-09-18 DIAGNOSIS — R55 SYNCOPE, UNSPECIFIED SYNCOPE TYPE: Primary | ICD-10-CM

## 2020-09-18 PROCEDURE — 93000 ELECTROCARDIOGRAM COMPLETE: CPT | Performed by: PHYSICIAN ASSISTANT

## 2020-09-18 PROCEDURE — 99213 OFFICE O/P EST LOW 20 MIN: CPT | Performed by: PHYSICIAN ASSISTANT

## 2020-09-18 NOTE — PROGRESS NOTES
Subjective   Chief Complaint   Patient presents with   • Syncope     x2 days ago       History of Present Illness   Pt is here today after a syncopal episode 2 days ago. She was cleaning out a Buddhism shed and sat down to take a break. She all of a sudden lost of her vision bilaterally and lost consciousness. No dbl vision or blurred vision prior to the episode. She had no HA, CP, SOA, palpitations, or urinary incontinence. She was alone, no one else witnessed the episode. She thinks she only lost consciousness for a few seconds but she cannot be sure.     She has had no episodes of postural dizziness or hx of syncope/presyncope.     BP has been in the 120s/70s at home.       Patient Active Problem List   Diagnosis   • Benign essential hypertension   • Hyperlipidemia   • Hypothyroidism   • Lung mass   • Prediabetes   • Abnormal CBC   • Family history of colon cancer   • Colon cancer screening       No Known Allergies    Current Outpatient Medications on File Prior to Visit   Medication Sig Dispense Refill   • calcium carbonate (OS-EMILIA) 600 MG tablet Take 600 mg by mouth daily.     • Cholecalciferol (VITAMIN D3) 2000 UNITS tablet Take  by mouth.     • ESTRACE VAGINAL 0.1 MG/GM vaginal cream      • Glucosamine-Chondroit-Vit C-Mn (GLUCOSAMINE 1500 COMPLEX PO) Take  by mouth.     • levothyroxine (SYNTHROID, LEVOTHROID) 75 MCG tablet Take 1 tablet by mouth Daily. Do not substitute, branded only. 90 tablet 3   • lisinopril (PRINIVIL,ZESTRIL) 20 MG tablet TAKE 1 TABLET DAILY 90 tablet 3   • psyllium (METAMUCIL) 58.6 % packet Take 1 packet by mouth daily.       No current facility-administered medications on file prior to visit.        Past Medical History:   Diagnosis Date   • Colon polyp    • Disease of thyroid gland    • Hyperlipidemia    • Hypertension    • Lung mass    • Tonsillitis        Family History   Problem Relation Age of Onset   • Heart valve disorder Mother    • Stroke Mother    • Coronary artery disease  Father    • Macular degeneration Father    • Colon cancer Brother        Social History     Socioeconomic History   • Marital status:      Spouse name: Not on file   • Number of children: 2   • Years of education: Not on file   • Highest education level: Not on file   Occupational History   • Occupation: retired teacher   Tobacco Use   • Smoking status: Never Smoker   • Smokeless tobacco: Never Used   Substance and Sexual Activity   • Alcohol use: Yes     Comment: occassional    • Drug use: No   • Sexual activity: Defer       Past Surgical History:   Procedure Laterality Date   • COLONOSCOPY N/A 11/5/2018    Procedure: COLONOSCOPY TO CECUM;  Surgeon: Yelena Mcduffie MD;  Location: [facility] ENDOSCOPY;  Service: General   • HYSTERECTOMY     • MOHS SURGERY     • TONSILLECTOMY           The following portions of the patient's history were reviewed and updated as appropriate: problem list, allergies, current medications, past medical history, past family history, past social history and past surgical history.    Review of Systems   Eyes: Negative for blurred vision and double vision.   Cardiovascular: Positive for syncope. Negative for chest pain, dyspnea on exertion, irregular heartbeat, leg swelling and palpitations.   Gastrointestinal: Negative for bowel incontinence.   Genitourinary: Negative for bladder incontinence.   Neurological: Negative for dizziness, headaches, light-headedness, seizures and tremors.       Immunization History   Administered Date(s) Administered   • Flu Mist 11/07/2013, 09/16/2014, 10/15/2015   • Fluad Quad 65+ 10/01/2019   • Fluzone High Dose =>65 Years (Vaxcare ONLY) 10/12/2016, 10/19/2017   • Pneumococcal Conjugate 13-Valent (PCV13) 04/26/2016   • Pneumococcal Polysaccharide (PPSV23) 09/16/2014   • Shingrix 10/02/2019, 10/27/2019, 12/06/2019   • Tdap 01/01/2012   • Zostavax 11/12/2009       Objective   Vitals:    09/18/20 1025 09/18/20 1051   BP:  140/84   Temp: 98.4 °F (36.9 °C)   "  Weight: 73 kg (161 lb)    Height: 172.7 cm (67.99\")    BP in the 120's/70s at home.     Body mass index is 24.49 kg/m².    Physical Exam  Vitals signs reviewed.   Constitutional:       Appearance: Normal appearance.   HENT:      Head: Normocephalic and atraumatic.   Eyes:      Extraocular Movements: Extraocular movements intact.      Conjunctiva/sclera: Conjunctivae normal.      Pupils: Pupils are equal, round, and reactive to light.   Neck:      Musculoskeletal: Neck supple.      Vascular: No carotid bruit.   Cardiovascular:      Rate and Rhythm: Normal rate and regular rhythm.      Heart sounds: Normal heart sounds. No murmur.      Comments: No edema  Pulmonary:      Effort: Pulmonary effort is normal.      Breath sounds: Normal breath sounds.   Neurological:      General: No focal deficit present.      Mental Status: She is alert and oriented to person, place, and time.      Cranial Nerves: No cranial nerve deficit.         ECG 12 Lead    Date/Time: 9/18/2020 1:05 PM  Performed by: Swetha Britt PA-C  Authorized by: Swetha Britt PA-C   Comparison: not compared with previous ECG   Rhythm: sinus rhythm  Rate: normal  BPM: 78  Conduction: conduction normal  QRS axis: normal    Clinical impression: normal ECG            Assessment/Plan   Krys was seen today for syncope.    Diagnoses and all orders for this visit:    Syncope, unspecified syncope type  -     CBC & Differential  -     Comprehensive Metabolic Panel  -     Magnesium  -     Holter monitor - 48 hour  -     Duplex Carotid Ultrasound CAR  -     Adult Transthoracic Echo Complete W/ Cont if Necessary Per Protocol    Acquired hypothyroidism  -     TSH    Other orders  -     ECG 12 Lead    Normal ECG, work up causes of syncope today.     Return for Lab Today.           Answers for HPI/ROS submitted by the patient on 9/17/2020   What is the primary reason for your visit?: Other  Please describe your symptoms.: On Sept 16 I felt faint and blacked out for a " couple seconds.  I would like this not to happen any more.  Have you had these symptoms before?: No  How long have you been having these symptoms?: 1-4 days  Please list any medications you are currently taking for this condition.: None  Please describe any probable cause for these symptoms. : ?

## 2020-09-19 LAB
ALBUMIN SERPL-MCNC: 4.6 G/DL (ref 3.5–5.2)
ALBUMIN/GLOB SERPL: 2 G/DL
ALP SERPL-CCNC: 67 U/L (ref 39–117)
ALT SERPL-CCNC: 10 U/L (ref 1–33)
AST SERPL-CCNC: 11 U/L (ref 1–32)
BASOPHILS # BLD AUTO: 0.05 10*3/MM3 (ref 0–0.2)
BASOPHILS NFR BLD AUTO: 1 % (ref 0–1.5)
BILIRUB SERPL-MCNC: 0.2 MG/DL (ref 0–1.2)
BUN SERPL-MCNC: 14 MG/DL (ref 8–23)
BUN/CREAT SERPL: 17.1 (ref 7–25)
CALCIUM SERPL-MCNC: 9.8 MG/DL (ref 8.6–10.5)
CHLORIDE SERPL-SCNC: 103 MMOL/L (ref 98–107)
CO2 SERPL-SCNC: 30.1 MMOL/L (ref 22–29)
CREAT SERPL-MCNC: 0.82 MG/DL (ref 0.57–1)
EOSINOPHIL # BLD AUTO: 0.06 10*3/MM3 (ref 0–0.4)
EOSINOPHIL NFR BLD AUTO: 1.2 % (ref 0.3–6.2)
ERYTHROCYTE [DISTWIDTH] IN BLOOD BY AUTOMATED COUNT: 12.5 % (ref 12.3–15.4)
GLOBULIN SER CALC-MCNC: 2.3 GM/DL
GLUCOSE SERPL-MCNC: 96 MG/DL (ref 65–99)
HCT VFR BLD AUTO: 36.8 % (ref 34–46.6)
HGB BLD-MCNC: 12.7 G/DL (ref 12–15.9)
IMM GRANULOCYTES # BLD AUTO: 0.02 10*3/MM3 (ref 0–0.05)
IMM GRANULOCYTES NFR BLD AUTO: 0.4 % (ref 0–0.5)
LYMPHOCYTES # BLD AUTO: 1.68 10*3/MM3 (ref 0.7–3.1)
LYMPHOCYTES NFR BLD AUTO: 33.2 % (ref 19.6–45.3)
MAGNESIUM SERPL-MCNC: 2.3 MG/DL (ref 1.6–2.4)
MCH RBC QN AUTO: 31.8 PG (ref 26.6–33)
MCHC RBC AUTO-ENTMCNC: 34.5 G/DL (ref 31.5–35.7)
MCV RBC AUTO: 92.2 FL (ref 79–97)
MONOCYTES # BLD AUTO: 0.37 10*3/MM3 (ref 0.1–0.9)
MONOCYTES NFR BLD AUTO: 7.3 % (ref 5–12)
NEUTROPHILS # BLD AUTO: 2.88 10*3/MM3 (ref 1.7–7)
NEUTROPHILS NFR BLD AUTO: 56.9 % (ref 42.7–76)
NRBC BLD AUTO-RTO: 0 /100 WBC (ref 0–0.2)
PLATELET # BLD AUTO: 256 10*3/MM3 (ref 140–450)
POTASSIUM SERPL-SCNC: 4.7 MMOL/L (ref 3.5–5.2)
PROT SERPL-MCNC: 6.9 G/DL (ref 6–8.5)
RBC # BLD AUTO: 3.99 10*6/MM3 (ref 3.77–5.28)
SODIUM SERPL-SCNC: 141 MMOL/L (ref 136–145)
TSH SERPL DL<=0.005 MIU/L-ACNC: 0.85 UIU/ML (ref 0.27–4.2)
WBC # BLD AUTO: 5.06 10*3/MM3 (ref 3.4–10.8)

## 2020-10-02 ENCOUNTER — HOSPITAL ENCOUNTER (OUTPATIENT)
Dept: CARDIOLOGY | Facility: HOSPITAL | Age: 70
Discharge: HOME | End: 2020-10-02

## 2020-10-02 VITALS
BODY MASS INDEX: 24.4 KG/M2 | HEIGHT: 68 IN | SYSTOLIC BLOOD PRESSURE: 174 MMHG | DIASTOLIC BLOOD PRESSURE: 84 MMHG | WEIGHT: 161 LBS | HEART RATE: 78 BPM

## 2020-10-02 LAB
AORTIC DIMENSIONLESS INDEX: 0.7 (DI)
BH CV ECHO MEAS - ACS: 2.1 CM
BH CV ECHO MEAS - AO MAX PG (FULL): 4 MMHG
BH CV ECHO MEAS - AO MAX PG: 8.9 MMHG
BH CV ECHO MEAS - AO MEAN PG (FULL): 1.7 MMHG
BH CV ECHO MEAS - AO MEAN PG: 4.5 MMHG
BH CV ECHO MEAS - AO ROOT AREA (BSA CORRECTED): 1.7
BH CV ECHO MEAS - AO ROOT AREA: 8.3 CM^2
BH CV ECHO MEAS - AO ROOT DIAM: 3.3 CM
BH CV ECHO MEAS - AO V2 MAX: 149.3 CM/SEC
BH CV ECHO MEAS - AO V2 MEAN: 98.3 CM/SEC
BH CV ECHO MEAS - AO V2 VTI: 31.9 CM
BH CV ECHO MEAS - AVA(I,A): 2.4 CM^2
BH CV ECHO MEAS - AVA(I,D): 2.4 CM^2
BH CV ECHO MEAS - AVA(V,A): 2.5 CM^2
BH CV ECHO MEAS - AVA(V,D): 2.5 CM^2
BH CV ECHO MEAS - BSA(HAYCOCK): 1.9 M^2
BH CV ECHO MEAS - BSA: 1.9 M^2
BH CV ECHO MEAS - BZI_BMI: 24.5 KILOGRAMS/M^2
BH CV ECHO MEAS - BZI_METRIC_HEIGHT: 172.7 CM
BH CV ECHO MEAS - BZI_METRIC_WEIGHT: 73 KG
BH CV ECHO MEAS - EDV(CUBED): 97.8 ML
BH CV ECHO MEAS - EDV(MOD-SP2): 82 ML
BH CV ECHO MEAS - EDV(MOD-SP4): 77 ML
BH CV ECHO MEAS - EDV(TEICH): 97.7 ML
BH CV ECHO MEAS - EF(CUBED): 67.8 %
BH CV ECHO MEAS - EF(MOD-BP): 63.5 %
BH CV ECHO MEAS - EF(MOD-SP2): 67.1 %
BH CV ECHO MEAS - EF(MOD-SP4): 61 %
BH CV ECHO MEAS - EF(TEICH): 59.4 %
BH CV ECHO MEAS - ESV(CUBED): 31.5 ML
BH CV ECHO MEAS - ESV(MOD-SP2): 27 ML
BH CV ECHO MEAS - ESV(MOD-SP4): 30 ML
BH CV ECHO MEAS - ESV(TEICH): 39.6 ML
BH CV ECHO MEAS - FS: 31.5 %
BH CV ECHO MEAS - IVS/LVPW: 1.1
BH CV ECHO MEAS - IVSD: 0.98 CM
BH CV ECHO MEAS - LAT PEAK E' VEL: 12.3 CM/SEC
BH CV ECHO MEAS - LV DIASTOLIC VOL/BSA (35-75): 41.3 ML/M^2
BH CV ECHO MEAS - LV MASS(C)D: 145.5 GRAMS
BH CV ECHO MEAS - LV MASS(C)DI: 78.1 GRAMS/M^2
BH CV ECHO MEAS - LV MAX PG: 4.9 MMHG
BH CV ECHO MEAS - LV MEAN PG: 2.7 MMHG
BH CV ECHO MEAS - LV SYSTOLIC VOL/BSA (12-30): 16.1 ML/M^2
BH CV ECHO MEAS - LV V1 MAX: 110.6 CM/SEC
BH CV ECHO MEAS - LV V1 MEAN: 76.5 CM/SEC
BH CV ECHO MEAS - LV V1 VTI: 22.6 CM
BH CV ECHO MEAS - LVIDD: 4.6 CM
BH CV ECHO MEAS - LVIDS: 3.2 CM
BH CV ECHO MEAS - LVLD AP2: 7.9 CM
BH CV ECHO MEAS - LVLD AP4: 7.4 CM
BH CV ECHO MEAS - LVLS AP2: 7 CM
BH CV ECHO MEAS - LVLS AP4: 6.1 CM
BH CV ECHO MEAS - LVOT AREA (M): 3.5 CM^2
BH CV ECHO MEAS - LVOT AREA: 3.3 CM^2
BH CV ECHO MEAS - LVOT DIAM: 2.1 CM
BH CV ECHO MEAS - LVPWD: 0.9 CM
BH CV ECHO MEAS - MED PEAK E' VEL: 8.6 CM/SEC
BH CV ECHO MEAS - MR MAX PG: 127.5 MMHG
BH CV ECHO MEAS - MR MAX VEL: 564.7 CM/SEC
BH CV ECHO MEAS - MV A DUR: 0.12 SEC
BH CV ECHO MEAS - MV A MAX VEL: 78.1 CM/SEC
BH CV ECHO MEAS - MV DEC SLOPE: 481.6 CM/SEC^2
BH CV ECHO MEAS - MV DEC TIME: 201 SEC
BH CV ECHO MEAS - MV E MAX VEL: 70.3 CM/SEC
BH CV ECHO MEAS - MV E/A: 0.9
BH CV ECHO MEAS - MV MAX PG: 3.9 MMHG
BH CV ECHO MEAS - MV MEAN PG: 2 MMHG
BH CV ECHO MEAS - MV P1/2T MAX VEL: 118.1 CM/SEC
BH CV ECHO MEAS - MV P1/2T: 71.8 MSEC
BH CV ECHO MEAS - MV V2 MAX: 99.1 CM/SEC
BH CV ECHO MEAS - MV V2 MEAN: 67.3 CM/SEC
BH CV ECHO MEAS - MV V2 VTI: 23.5 CM
BH CV ECHO MEAS - MVA P1/2T LCG: 1.9 CM^2
BH CV ECHO MEAS - MVA(P1/2T): 3.1 CM^2
BH CV ECHO MEAS - MVA(VTI): 3.2 CM^2
BH CV ECHO MEAS - PA ACC TIME: 0.08 SEC
BH CV ECHO MEAS - PA MAX PG (FULL): 2 MMHG
BH CV ECHO MEAS - PA MAX PG: 5.1 MMHG
BH CV ECHO MEAS - PA PR(ACCEL): 42.2 MMHG
BH CV ECHO MEAS - PA V2 MAX: 112.7 CM/SEC
BH CV ECHO MEAS - PULM A REVS DUR: 0.11 SEC
BH CV ECHO MEAS - PULM A REVS VEL: 34.9 CM/SEC
BH CV ECHO MEAS - PULM DIAS VEL: 39.3 CM/SEC
BH CV ECHO MEAS - PULM S/D: 1.6
BH CV ECHO MEAS - PULM SYS VEL: 62.1 CM/SEC
BH CV ECHO MEAS - RV MAX PG: 3 MMHG
BH CV ECHO MEAS - RV MEAN PG: 1.7 MMHG
BH CV ECHO MEAS - RV V1 MAX: 87.1 CM/SEC
BH CV ECHO MEAS - RV V1 MEAN: 62.9 CM/SEC
BH CV ECHO MEAS - RV V1 VTI: 17.7 CM
BH CV ECHO MEAS - SI(AO): 142.7 ML/M^2
BH CV ECHO MEAS - SI(CUBED): 35.6 ML/M^2
BH CV ECHO MEAS - SI(LVOT): 40.6 ML/M^2
BH CV ECHO MEAS - SI(MOD-SP2): 29.5 ML/M^2
BH CV ECHO MEAS - SI(MOD-SP4): 25.2 ML/M^2
BH CV ECHO MEAS - SI(TEICH): 31.2 ML/M^2
BH CV ECHO MEAS - SV(AO): 266 ML
BH CV ECHO MEAS - SV(CUBED): 66.4 ML
BH CV ECHO MEAS - SV(LVOT): 75.6 ML
BH CV ECHO MEAS - SV(MOD-SP2): 55 ML
BH CV ECHO MEAS - SV(MOD-SP4): 47 ML
BH CV ECHO MEAS - SV(TEICH): 58.1 ML
BH CV ECHO MEAS - TAPSE (>1.6): 2.5 CM
BH CV ECHO MEASUREMENTS AVERAGE E/E' RATIO: 6.73
BH CV XLRA - RV BASE: 3.9 CM
BH CV XLRA - RV MID: 3.6 CM
BH CV XLRA - TDI S': 12.2 CM/SEC
BH CV XLRA MEAS LEFT DIST CCA EDV: -22.4 CM/SEC
BH CV XLRA MEAS LEFT DIST CCA PSV: -84.3 CM/SEC
BH CV XLRA MEAS LEFT DIST ICA EDV: -28.5 CM/SEC
BH CV XLRA MEAS LEFT DIST ICA PSV: -72.9 CM/SEC
BH CV XLRA MEAS LEFT ICA/CCA RATIO: 0.86
BH CV XLRA MEAS LEFT MID ICA EDV: -22.8 CM/SEC
BH CV XLRA MEAS LEFT MID ICA PSV: -61.5 CM/SEC
BH CV XLRA MEAS LEFT PROX CCA EDV: 29.6 CM/SEC
BH CV XLRA MEAS LEFT PROX CCA PSV: 115.2 CM/SEC
BH CV XLRA MEAS LEFT PROX ECA EDV: -25.9 CM/SEC
BH CV XLRA MEAS LEFT PROX ECA PSV: -90.4 CM/SEC
BH CV XLRA MEAS LEFT PROX ICA EDV: -22.4 CM/SEC
BH CV XLRA MEAS LEFT PROX ICA PSV: -79.2 CM/SEC
BH CV XLRA MEAS LEFT PROX SCLA PSV: 160.8 CM/SEC
BH CV XLRA MEAS LEFT VERTEBRAL A EDV: -15.4 CM/SEC
BH CV XLRA MEAS LEFT VERTEBRAL A PSV: -47 CM/SEC
BH CV XLRA MEAS RIGHT DIST CCA EDV: -18.9 CM/SEC
BH CV XLRA MEAS RIGHT DIST CCA PSV: -75.7 CM/SEC
BH CV XLRA MEAS RIGHT DIST ICA EDV: -28.5 CM/SEC
BH CV XLRA MEAS RIGHT DIST ICA PSV: -79.5 CM/SEC
BH CV XLRA MEAS RIGHT ICA/CCA RATIO: 1.05
BH CV XLRA MEAS RIGHT MID ICA EDV: -20.6 CM/SEC
BH CV XLRA MEAS RIGHT MID ICA PSV: -65.9 CM/SEC
BH CV XLRA MEAS RIGHT PROX CCA EDV: 20.3 CM/SEC
BH CV XLRA MEAS RIGHT PROX CCA PSV: 105.1 CM/SEC
BH CV XLRA MEAS RIGHT PROX ECA EDV: -12.6 CM/SEC
BH CV XLRA MEAS RIGHT PROX ECA PSV: -87.6 CM/SEC
BH CV XLRA MEAS RIGHT PROX ICA EDV: -16.7 CM/SEC
BH CV XLRA MEAS RIGHT PROX ICA PSV: -68 CM/SEC
BH CV XLRA MEAS RIGHT PROX SCLA PSV: 175.6 CM/SEC
BH CV XLRA MEAS RIGHT VERTEBRAL A EDV: -13.2 CM/SEC
BH CV XLRA MEAS RIGHT VERTEBRAL A PSV: -37 CM/SEC
LEFT ARM BP: NORMAL MMHG
LEFT ATRIUM VOLUME INDEX: 25.9 ML/M2
RIGHT ARM BP: NORMAL MMHG

## 2020-10-02 PROCEDURE — 93226 XTRNL ECG REC<48 HR SCAN A/R: CPT

## 2020-10-02 PROCEDURE — 93880 EXTRACRANIAL BILAT STUDY: CPT

## 2020-10-02 PROCEDURE — 93225 XTRNL ECG REC<48 HRS REC: CPT

## 2020-10-02 PROCEDURE — 93306 TTE W/DOPPLER COMPLETE: CPT | Performed by: INTERNAL MEDICINE

## 2020-10-02 PROCEDURE — 93306 TTE W/DOPPLER COMPLETE: CPT

## 2020-10-05 PROCEDURE — 93227 XTRNL ECG REC<48 HR R&I: CPT | Performed by: INTERNAL MEDICINE

## 2020-10-13 DIAGNOSIS — E03.9 ACQUIRED HYPOTHYROIDISM: Primary | ICD-10-CM

## 2020-10-14 RX ORDER — LEVOTHYROXINE SODIUM 75 MCG
TABLET ORAL
Qty: 90 TABLET | Refills: 3 | Status: SHIPPED | OUTPATIENT
Start: 2020-10-14 | End: 2021-09-29

## 2020-10-27 ENCOUNTER — OFFICE VISIT (OUTPATIENT)
Dept: INTERNAL MEDICINE | Facility: CLINIC | Age: 70
End: 2020-10-27

## 2020-10-27 VITALS
SYSTOLIC BLOOD PRESSURE: 140 MMHG | TEMPERATURE: 98 F | DIASTOLIC BLOOD PRESSURE: 85 MMHG | HEIGHT: 68 IN | WEIGHT: 165 LBS | BODY MASS INDEX: 25.01 KG/M2

## 2020-10-27 DIAGNOSIS — R55 SYNCOPE, UNSPECIFIED SYNCOPE TYPE: ICD-10-CM

## 2020-10-27 DIAGNOSIS — I10 BENIGN ESSENTIAL HYPERTENSION: Primary | ICD-10-CM

## 2020-10-27 DIAGNOSIS — I47.1 PSVT (PAROXYSMAL SUPRAVENTRICULAR TACHYCARDIA) (HCC): ICD-10-CM

## 2020-10-27 PROCEDURE — 99213 OFFICE O/P EST LOW 20 MIN: CPT | Performed by: PHYSICIAN ASSISTANT

## 2020-10-27 RX ORDER — LISINOPRIL 20 MG/1
40 TABLET ORAL DAILY
Qty: 90 TABLET | Refills: 3
Start: 2020-10-27 | End: 2020-11-30

## 2020-10-27 NOTE — PROGRESS NOTES
Subjective   Chief Complaint   Patient presents with   • Syncope     cardiac testing follow up       History of Present Illness     Pt is here today for fup after her syncopal episodes. She states her BP has been running on the higher side for the last month. She has brought readings with her today and it is averaging 130-140/80. She has not had any further syncopal episodes. She denies CP, SOA, palpitations, and tachycardia.      Patient Active Problem List   Diagnosis   • Benign essential hypertension   • Hyperlipidemia   • Hypothyroidism   • Lung mass   • Prediabetes   • Abnormal CBC   • Family history of colon cancer   • Colon cancer screening       No Known Allergies    Current Outpatient Medications on File Prior to Visit   Medication Sig Dispense Refill   • calcium carbonate (OS-EMILIA) 600 MG tablet Take 600 mg by mouth daily.     • Cholecalciferol (VITAMIN D3) 2000 UNITS tablet Take  by mouth.     • ESTRACE VAGINAL 0.1 MG/GM vaginal cream      • Glucosamine-Chondroit-Vit C-Mn (GLUCOSAMINE 1500 COMPLEX PO) Take  by mouth.     • psyllium (METAMUCIL) 58.6 % packet Take 1 packet by mouth daily.     • Synthroid 75 MCG tablet TAKE 1 TABLET DAILY 90 tablet 3   • [DISCONTINUED] lisinopril (PRINIVIL,ZESTRIL) 20 MG tablet TAKE 1 TABLET DAILY 90 tablet 3     No current facility-administered medications on file prior to visit.        Past Medical History:   Diagnosis Date   • Colon polyp    • Disease of thyroid gland    • Hyperlipidemia    • Hypertension    • Lung mass    • Tonsillitis        Family History   Problem Relation Age of Onset   • Heart valve disorder Mother    • Stroke Mother    • Coronary artery disease Father    • Macular degeneration Father    • Colon cancer Brother        Social History     Socioeconomic History   • Marital status:      Spouse name: Not on file   • Number of children: 2   • Years of education: Not on file   • Highest education level: Not on file   Occupational History   •  "Occupation: retired teacher   Tobacco Use   • Smoking status: Never Smoker   • Smokeless tobacco: Never Used   Substance and Sexual Activity   • Alcohol use: Yes     Comment: occassional    • Drug use: No   • Sexual activity: Defer       Past Surgical History:   Procedure Laterality Date   • COLONOSCOPY N/A 11/5/2018    Procedure: COLONOSCOPY TO CECUM;  Surgeon: Yelena Mcduffie MD;  Location: [facility];  Service: General   • HYSTERECTOMY     • MOHS SURGERY     • TONSILLECTOMY           The following portions of the patient's history were reviewed and updated as appropriate: problem list, allergies, current medications, past medical history, past family history, past social history and past surgical history.    ROS    Immunization History   Administered Date(s) Administered   • Flu Mist 11/07/2013, 09/16/2014, 10/15/2015   • Fluad Quad 65+ 10/01/2019   • Fluzone High Dose =>65 Years (Vaxcare ONLY) 10/12/2016, 10/19/2017   • Pneumococcal Conjugate 13-Valent (PCV13) 04/26/2016   • Pneumococcal Polysaccharide (PPSV23) 09/16/2014   • Shingrix 10/02/2019, 10/27/2019, 12/06/2019   • Tdap 01/01/2012   • Zostavax 11/12/2009       Objective   Vitals:    10/27/20 0911 10/27/20 0921   BP:  140/85   Temp: 98 °F (36.7 °C)    Weight: 74.8 kg (165 lb)    Height: 172.7 cm (68\")      Body mass index is 25.09 kg/m².  Physical Exam  Vitals signs reviewed.   Constitutional:       Appearance: Normal appearance.   HENT:      Head: Normocephalic and atraumatic.   Cardiovascular:      Rate and Rhythm: Normal rate and regular rhythm.      Heart sounds: Normal heart sounds.   Pulmonary:      Effort: Pulmonary effort is normal.      Breath sounds: Normal breath sounds.   Neurological:      Mental Status: She is alert.           Assessment/Plan   Diagnoses and all orders for this visit:    1. Benign essential hypertension (Primary)  -     lisinopril (PRINIVIL,ZESTRIL) 20 MG tablet; Take 2 tablets by mouth Daily.  Dispense: 90 tablet; " Refill: 3    BP is elevated, increase lisinopril to 40 mg daily. Reviewed carotid US and echo with patient which were both normal. Her holter monitor showed 6 runs of SVT with longest lasting 66 beats, due to her syncopal episode I am going to refer her to cardiology for a more extensive monitor.          Answers for HPI/ROS submitted by the patient on 10/20/2020   What is the primary reason for your visit?: Other  Please describe your symptoms.: Follow up after echo, carotid, heart monitor tests.  Have you had these symptoms before?: No  How long have you been having these symptoms?: 1-4 days  Please list any medications you are currently taking for this condition.: NA  Please describe any probable cause for these symptoms. : Good question!

## 2020-10-29 ENCOUNTER — RESULTS ENCOUNTER (OUTPATIENT)
Dept: INTERNAL MEDICINE | Facility: CLINIC | Age: 70
End: 2020-10-29

## 2020-10-29 DIAGNOSIS — R73.03 PREDIABETES: ICD-10-CM

## 2020-10-29 DIAGNOSIS — E03.9 ACQUIRED HYPOTHYROIDISM: ICD-10-CM

## 2020-11-11 ENCOUNTER — APPOINTMENT (OUTPATIENT)
Dept: CARDIOLOGY | Facility: HOSPITAL | Age: 70
End: 2020-11-11

## 2020-11-11 ENCOUNTER — TELEPHONE (OUTPATIENT)
Dept: INTERNAL MEDICINE | Facility: CLINIC | Age: 70
End: 2020-11-11

## 2020-11-11 ENCOUNTER — HOSPITAL ENCOUNTER (EMERGENCY)
Facility: HOSPITAL | Age: 70
Discharge: HOME | End: 2020-11-11
Attending: EMERGENCY MEDICINE | Admitting: EMERGENCY MEDICINE

## 2020-11-11 ENCOUNTER — APPOINTMENT (OUTPATIENT)
Dept: GENERAL RADIOLOGY | Facility: HOSPITAL | Age: 70
End: 2020-11-11

## 2020-11-11 VITALS
SYSTOLIC BLOOD PRESSURE: 151 MMHG | OXYGEN SATURATION: 97 % | BODY MASS INDEX: 25.09 KG/M2 | TEMPERATURE: 97.3 F | RESPIRATION RATE: 16 BRPM | HEART RATE: 81 BPM | HEIGHT: 68 IN | DIASTOLIC BLOOD PRESSURE: 83 MMHG

## 2020-11-11 DIAGNOSIS — B34.8 RHINOVIRUS INFECTION: ICD-10-CM

## 2020-11-11 DIAGNOSIS — R00.2 PALPITATIONS: Primary | ICD-10-CM

## 2020-11-11 LAB
ALBUMIN SERPL-MCNC: 4.4 G/DL (ref 3.5–5.2)
ALBUMIN/GLOB SERPL: 1.7 G/DL
ALP SERPL-CCNC: 71 U/L (ref 39–117)
ALT SERPL W P-5'-P-CCNC: 10 U/L (ref 1–33)
ANION GAP SERPL CALCULATED.3IONS-SCNC: 7.8 MMOL/L (ref 5–15)
AST SERPL-CCNC: 13 U/L (ref 1–32)
B PARAPERT DNA SPEC QL NAA+PROBE: NOT DETECTED
B PERT DNA SPEC QL NAA+PROBE: NOT DETECTED
BASOPHILS # BLD AUTO: 0.03 10*3/MM3 (ref 0–0.2)
BASOPHILS NFR BLD AUTO: 0.6 % (ref 0–1.5)
BILIRUB SERPL-MCNC: 0.3 MG/DL (ref 0–1.2)
BUN SERPL-MCNC: 10 MG/DL (ref 8–23)
BUN/CREAT SERPL: 11.8 (ref 7–25)
C PNEUM DNA NPH QL NAA+NON-PROBE: NOT DETECTED
CALCIUM SPEC-SCNC: 9.6 MG/DL (ref 8.6–10.5)
CHLORIDE SERPL-SCNC: 102 MMOL/L (ref 98–107)
CO2 SERPL-SCNC: 31.2 MMOL/L (ref 22–29)
CREAT SERPL-MCNC: 0.85 MG/DL (ref 0.57–1)
DEPRECATED RDW RBC AUTO: 43.5 FL (ref 37–54)
EOSINOPHIL # BLD AUTO: 0.04 10*3/MM3 (ref 0–0.4)
EOSINOPHIL NFR BLD AUTO: 0.7 % (ref 0.3–6.2)
ERYTHROCYTE [DISTWIDTH] IN BLOOD BY AUTOMATED COUNT: 12.6 % (ref 12.3–15.4)
FLUAV SUBTYP SPEC NAA+PROBE: NOT DETECTED
FLUBV RNA ISLT QL NAA+PROBE: NOT DETECTED
GFR SERPL CREATININE-BSD FRML MDRD: 66 ML/MIN/1.73
GLOBULIN UR ELPH-MCNC: 2.6 GM/DL
GLUCOSE SERPL-MCNC: 105 MG/DL (ref 65–99)
HADV DNA SPEC NAA+PROBE: NOT DETECTED
HCOV 229E RNA SPEC QL NAA+PROBE: NOT DETECTED
HCOV HKU1 RNA SPEC QL NAA+PROBE: NOT DETECTED
HCOV NL63 RNA SPEC QL NAA+PROBE: NOT DETECTED
HCOV OC43 RNA SPEC QL NAA+PROBE: NOT DETECTED
HCT VFR BLD AUTO: 39.4 % (ref 34–46.6)
HGB BLD-MCNC: 12.6 G/DL (ref 12–15.9)
HMPV RNA NPH QL NAA+NON-PROBE: NOT DETECTED
HOLD SPECIMEN: NORMAL
HOLD SPECIMEN: NORMAL
HPIV1 RNA SPEC QL NAA+PROBE: NOT DETECTED
HPIV2 RNA SPEC QL NAA+PROBE: NOT DETECTED
HPIV3 RNA NPH QL NAA+PROBE: NOT DETECTED
HPIV4 P GENE NPH QL NAA+PROBE: NOT DETECTED
IMM GRANULOCYTES # BLD AUTO: 0.02 10*3/MM3 (ref 0–0.05)
IMM GRANULOCYTES NFR BLD AUTO: 0.4 % (ref 0–0.5)
LYMPHOCYTES # BLD AUTO: 1.54 10*3/MM3 (ref 0.7–3.1)
LYMPHOCYTES NFR BLD AUTO: 28.7 % (ref 19.6–45.3)
M PNEUMO IGG SER IA-ACNC: NOT DETECTED
MAGNESIUM SERPL-MCNC: 2.3 MG/DL (ref 1.6–2.4)
MCH RBC QN AUTO: 30.1 PG (ref 26.6–33)
MCHC RBC AUTO-ENTMCNC: 32 G/DL (ref 31.5–35.7)
MCV RBC AUTO: 94.3 FL (ref 79–97)
MONOCYTES # BLD AUTO: 0.58 10*3/MM3 (ref 0.1–0.9)
MONOCYTES NFR BLD AUTO: 10.8 % (ref 5–12)
NEUTROPHILS NFR BLD AUTO: 3.15 10*3/MM3 (ref 1.7–7)
NEUTROPHILS NFR BLD AUTO: 58.8 % (ref 42.7–76)
NRBC BLD AUTO-RTO: 0 /100 WBC (ref 0–0.2)
PLATELET # BLD AUTO: 227 10*3/MM3 (ref 140–450)
PMV BLD AUTO: 9.2 FL (ref 6–12)
POTASSIUM SERPL-SCNC: 4.6 MMOL/L (ref 3.5–5.2)
PROT SERPL-MCNC: 7 G/DL (ref 6–8.5)
QT INTERVAL: 351 MS
RBC # BLD AUTO: 4.18 10*6/MM3 (ref 3.77–5.28)
RHINOVIRUS RNA SPEC NAA+PROBE: DETECTED
RSV RNA NPH QL NAA+NON-PROBE: NOT DETECTED
SARS-COV-2 RNA NPH QL NAA+NON-PROBE: NOT DETECTED
SODIUM SERPL-SCNC: 141 MMOL/L (ref 136–145)
TROPONIN T SERPL-MCNC: <0.01 NG/ML (ref 0–0.03)
TSH SERPL DL<=0.05 MIU/L-ACNC: 0.67 UIU/ML (ref 0.27–4.2)
WBC # BLD AUTO: 5.36 10*3/MM3 (ref 3.4–10.8)
WHOLE BLOOD HOLD SPECIMEN: NORMAL
WHOLE BLOOD HOLD SPECIMEN: NORMAL

## 2020-11-11 PROCEDURE — 93005 ELECTROCARDIOGRAM TRACING: CPT | Performed by: PHYSICIAN ASSISTANT

## 2020-11-11 PROCEDURE — 84484 ASSAY OF TROPONIN QUANT: CPT | Performed by: PHYSICIAN ASSISTANT

## 2020-11-11 PROCEDURE — 0296T HC EXT ECG > 48HR TO 21 DAY RCRD W/CONECT INTL RCRD: CPT

## 2020-11-11 PROCEDURE — 99284 EMERGENCY DEPT VISIT MOD MDM: CPT

## 2020-11-11 PROCEDURE — 83735 ASSAY OF MAGNESIUM: CPT | Performed by: PHYSICIAN ASSISTANT

## 2020-11-11 PROCEDURE — 93005 ELECTROCARDIOGRAM TRACING: CPT | Performed by: EMERGENCY MEDICINE

## 2020-11-11 PROCEDURE — 93010 ELECTROCARDIOGRAM REPORT: CPT | Performed by: INTERNAL MEDICINE

## 2020-11-11 PROCEDURE — 0202U NFCT DS 22 TRGT SARS-COV-2: CPT | Performed by: PHYSICIAN ASSISTANT

## 2020-11-11 PROCEDURE — 80053 COMPREHEN METABOLIC PANEL: CPT | Performed by: PHYSICIAN ASSISTANT

## 2020-11-11 PROCEDURE — 71045 X-RAY EXAM CHEST 1 VIEW: CPT

## 2020-11-11 PROCEDURE — 85025 COMPLETE CBC W/AUTO DIFF WBC: CPT | Performed by: PHYSICIAN ASSISTANT

## 2020-11-11 PROCEDURE — 84443 ASSAY THYROID STIM HORMONE: CPT | Performed by: PHYSICIAN ASSISTANT

## 2020-11-11 PROCEDURE — 93005 ELECTROCARDIOGRAM TRACING: CPT

## 2020-11-11 PROCEDURE — 36415 COLL VENOUS BLD VENIPUNCTURE: CPT

## 2020-11-11 RX ORDER — SODIUM CHLORIDE 0.9 % (FLUSH) 0.9 %
10 SYRINGE (ML) INJECTION AS NEEDED
Status: DISCONTINUED | OUTPATIENT
Start: 2020-11-11 | End: 2020-11-11 | Stop reason: HOSPADM

## 2020-11-11 NOTE — ED PROVIDER NOTES
EMERGENCY DEPARTMENT ENCOUNTER    Room Number:  41/41  Date seen:  11/11/2020  Time seen: 11:48 EST  PCP: Swetha Britt PA-C  Historian: patient      HPI:  Chief Complaint: Heart racing    A complete HPI/ROS/PMH/PSH/SH/FH are unobtainable due to: none    Context: Krys Slade is a 70 y.o. female who presents to the ED for evaluation of sudden onset heart racing that started around 8:30 AM today and lasted for 30 to 60 minutes and has been resolved since.  She is asymptomatic now.  She states that nothing seemed to bring it on or make it worse and nothing made it better.  She denies having any chest pain shortness of breath diaphoresis lightheadedness or syncope during the episode.  It occurred while she was eating breakfast.  She states she has had a couple more brief episodes of this in the last couple months and a syncopal episode in September. She had an outpatient holter monitor then and has an initial appointment scheduled with Dr. Riley of cardiology later this month to follow-up on her results.  She states she she was having 1 cup of coffee this morning, does not drink energy drinks.  She has had a mild cough and runny nose for the last 3 to 4 days after babysitting her grandchildren who had similar symptoms.  She is not taking any decongestants.  She does take Synthroid for thyroid disease.         PAST MEDICAL HISTORY  Active Ambulatory Problems     Diagnosis Date Noted   • Benign essential hypertension 03/15/2016   • Hyperlipidemia 03/15/2016   • Hypothyroidism 03/15/2016   • Lung mass 03/15/2016   • Prediabetes 04/26/2016   • Abnormal CBC 04/26/2016   • Family history of colon cancer 08/30/2018   • Colon cancer screening 08/30/2018     Resolved Ambulatory Problems     Diagnosis Date Noted   • Chest tightness 03/15/2016     Past Medical History:   Diagnosis Date   • Colon polyp    • Disease of thyroid gland    • Hypertension    • Tonsillitis          PAST SURGICAL HISTORY  Past Surgical History:    Procedure Laterality Date   • COLONOSCOPY N/A 11/5/2018    Procedure: COLONOSCOPY TO CECUM;  Surgeon: Yelena Mcduffie MD;  Location: [facility] ENDOSCOPY;  Service: General   • HYSTERECTOMY     • MOHS SURGERY     • TONSILLECTOMY           FAMILY HISTORY  Family History   Problem Relation Age of Onset   • Heart valve disorder Mother    • Stroke Mother    • Coronary artery disease Father    • Macular degeneration Father    • Colon cancer Brother          SOCIAL HISTORY  Social History     Socioeconomic History   • Marital status:      Spouse name: Not on file   • Number of children: 2   • Years of education: Not on file   • Highest education level: Not on file   Occupational History   • Occupation: retired teacher   Tobacco Use   • Smoking status: Never Smoker   • Smokeless tobacco: Never Used   Substance and Sexual Activity   • Alcohol use: Yes     Comment: occassional    • Drug use: No   • Sexual activity: Defer         ALLERGIES  Patient has no known allergies.        REVIEW OF SYSTEMS  Review of Systems     All systems reviewed and negative except for those discussed in HPI.       PHYSICAL EXAM  ED Triage Vitals   Temp Heart Rate Resp BP SpO2   11/11/20 1105 11/11/20 1105 11/11/20 1105 11/11/20 1119 11/11/20 1105   97.3 °F (36.3 °C) 114 18 170/83 98 %      Temp src Heart Rate Source Patient Position BP Location FiO2 (%)   11/11/20 1105 11/11/20 1105 11/11/20 1130 11/11/20 1130 --   Tympanic Monitor Sitting Right arm          GENERAL: Well-appearing, not distressed  HENT: atraumatic  EYES: no scleral icterus  CV: regular rhythm, regular rate in the 80s  RESPIRATORY: normal effort CTA B  ABDOMEN: soft, nontender nondistended normal bowel sounds no guarding or rigidity, no edema  MUSCULOSKELETAL: no deformity  NEURO: alert, moves all extremities, follows commands  SKIN: warm, dry    Vital signs and nursing notes reviewed.          LAB RESULTS  Recent Results (from the past 24 hour(s))   ECG 12 Lead    Collection  Time: 11/11/20 11:08 AM   Result Value Ref Range    QT Interval 351 ms   Comprehensive Metabolic Panel    Collection Time: 11/11/20 11:38 AM    Specimen: Blood   Result Value Ref Range    Glucose 105 (H) 65 - 99 mg/dL    BUN 10 8 - 23 mg/dL    Creatinine 0.85 0.57 - 1.00 mg/dL    Sodium 141 136 - 145 mmol/L    Potassium 4.6 3.5 - 5.2 mmol/L    Chloride 102 98 - 107 mmol/L    CO2 31.2 (H) 22.0 - 29.0 mmol/L    Calcium 9.6 8.6 - 10.5 mg/dL    Total Protein 7.0 6.0 - 8.5 g/dL    Albumin 4.40 3.50 - 5.20 g/dL    ALT (SGPT) 10 1 - 33 U/L    AST (SGOT) 13 1 - 32 U/L    Alkaline Phosphatase 71 39 - 117 U/L    Total Bilirubin 0.3 0.0 - 1.2 mg/dL    eGFR Non African Amer 66 >60 mL/min/1.73    Globulin 2.6 gm/dL    A/G Ratio 1.7 g/dL    BUN/Creatinine Ratio 11.8 7.0 - 25.0    Anion Gap 7.8 5.0 - 15.0 mmol/L   Troponin    Collection Time: 11/11/20 11:38 AM    Specimen: Blood   Result Value Ref Range    Troponin T <0.010 0.000 - 0.030 ng/mL   Magnesium    Collection Time: 11/11/20 11:38 AM    Specimen: Blood   Result Value Ref Range    Magnesium 2.3 1.6 - 2.4 mg/dL   TSH    Collection Time: 11/11/20 11:38 AM    Specimen: Blood   Result Value Ref Range    TSH 0.669 0.270 - 4.200 uIU/mL   Light Blue Top    Collection Time: 11/11/20 11:38 AM   Result Value Ref Range    Extra Tube hold for add-on    Green Top (Gel)    Collection Time: 11/11/20 11:38 AM   Result Value Ref Range    Extra Tube Hold for add-ons.    Lavender Top    Collection Time: 11/11/20 11:38 AM   Result Value Ref Range    Extra Tube hold for add-on    Gold Top - SST    Collection Time: 11/11/20 11:38 AM   Result Value Ref Range    Extra Tube Hold for add-ons.    CBC Auto Differential    Collection Time: 11/11/20 11:38 AM    Specimen: Blood   Result Value Ref Range    WBC 5.36 3.40 - 10.80 10*3/mm3    RBC 4.18 3.77 - 5.28 10*6/mm3    Hemoglobin 12.6 12.0 - 15.9 g/dL    Hematocrit 39.4 34.0 - 46.6 %    MCV 94.3 79.0 - 97.0 fL    MCH 30.1 26.6 - 33.0 pg    MCHC 32.0  31.5 - 35.7 g/dL    RDW 12.6 12.3 - 15.4 %    RDW-SD 43.5 37.0 - 54.0 fl    MPV 9.2 6.0 - 12.0 fL    Platelets 227 140 - 450 10*3/mm3    Neutrophil % 58.8 42.7 - 76.0 %    Lymphocyte % 28.7 19.6 - 45.3 %    Monocyte % 10.8 5.0 - 12.0 %    Eosinophil % 0.7 0.3 - 6.2 %    Basophil % 0.6 0.0 - 1.5 %    Immature Grans % 0.4 0.0 - 0.5 %    Neutrophils, Absolute 3.15 1.70 - 7.00 10*3/mm3    Lymphocytes, Absolute 1.54 0.70 - 3.10 10*3/mm3    Monocytes, Absolute 0.58 0.10 - 0.90 10*3/mm3    Eosinophils, Absolute 0.04 0.00 - 0.40 10*3/mm3    Basophils, Absolute 0.03 0.00 - 0.20 10*3/mm3    Immature Grans, Absolute 0.02 0.00 - 0.05 10*3/mm3    nRBC 0.0 0.0 - 0.2 /100 WBC   Respiratory Panel PCR w/COVID-19(SARS-CoV-2) [facility] In-House, NP Swab in [facility], 3-4 HR TAT - Swab, Nasopharynx    Collection Time: 11/11/20 12:23 PM    Specimen: Nasopharynx; Swab   Result Value Ref Range    ADENOVIRUS, PCR Not Detected Not Detected    Coronavirus 229E Not Detected Not Detected    Coronavirus HKU1 Not Detected Not Detected    Coronavirus NL63 Not Detected Not Detected    Coronavirus OC43 Not Detected Not Detected    COVID19 Not Detected Not Detected - Ref. Range    Human Metapneumovirus Not Detected Not Detected    Human Rhinovirus/Enterovirus Detected (A) Not Detected    Influenza A PCR Not Detected Not Detected    Influenza B PCR Not Detected Not Detected    Parainfluenza Virus 1 Not Detected Not Detected    Parainfluenza Virus 2 Not Detected Not Detected    Parainfluenza Virus 3 Not Detected Not Detected    Parainfluenza Virus 4 Not Detected Not Detected    RSV, PCR Not Detected Not Detected    Bordetella pertussis pcr Not Detected Not Detected    Bordetella parapertussis PCR Not Detected Not Detected    Chlamydophila pneumoniae PCR Not Detected Not Detected    Mycoplasma pneumo by PCR Not Detected Not Detected       Ordered the above labs and independently reviewed the results.        RADIOLOGY  XR Chest 1 View    Final Result   There is no evidence for an active or acute cardiopulmonary   process.       This report was finalized on 11/11/2020 5:07 PM by Dr. Tiago Barkley M.D.              I ordered the above noted radiological studies. Reviewed by me and discussed with radiologist.  See dictation for official radiology interpretation.    PROCEDURES  Procedures        MEDICATIONS GIVEN IN ER  Medications - No data to display          PROGRESS AND CONSULTS    DDX includes but not limited to dysrhythmia, thyroid disease, dehydration, electrolyte abnormality    ED Course as of Nov 11 2010 Wed Nov 11, 2020   1251 TSH Baseline: 0.669 [KA]   1251 Troponin T: <0.010 [KA]   1251 Magnesium: 2.3 [KA]   1251 WBC: 5.36 [KA]   1252 My interpretation of the chest x-ray is no acute infiltrate, no cardiomegaly.    [KA]   1252 My interpretation of the EKG performed at 1108 is sinus rhythm rate 83 normal axis normal intervals no ST elevation or ischemic ST-T wave changes.  No significant change from prior on 9/18/2020.    [KA]   1345 On 10/2/2020 patient had transthoracic echocardiogram that showed an EF of 63% with mild mitral valve regurgitation and mild tricuspid valve regurgitation.  Carotid duplex ultrasound showed normal proximal right ICA and proximal left ICA plaque without significant stenosis.  48-hour Holter monitor showed predominantly sinus rhythm, rare PACs, 8 episodes of SVT, the longest and fastest lasted 66 beats with a rate of 167 bpm.  Rare PVCs, no episodes of V. tach.    [KA]   1345 Human Rhinovirus/Enterovirus(!): Detected [KA]   1441 Discussed patient with Dr. Riley, Cardiologist.  He would like for the patient to have a 2-week Zio patch placed prior to discharge from the ER and follow-up with him as scheduled on the 23rd.    [KA]   145 I reassessed the patient, she is resting comfortably.  Vital signs stable on the monitor.  No recurrent palpitations here, work-up unremarkable save for positive human  rhinovirus.  Cautions for this discussed.  I advised her of my discussion with Dr. Riley, the plan for 2-week Zio patch and follow-up with him as scheduled.  She is agreeable.  She will return for chest pain, shortness of breath, sustained palpitations or passing out and is stable for discharge after application of her Zio patch.    [KA]      ED Course User Index  [KA] Suzie Oliveira PA        Reviewed pt's history and workup with Dr. Colon.  After a bedside evaluation; they agree with the plan of care      Patient was placed in face mask in first look. Patient was wearing facemask each time I entered the room and throughout our encounter. I wore protective equipment throughout this patient encounter including a face mask, eye shield and gloves. Hand hygiene was performed before donning protective equipment and after removal when leaving the room.        DIAGNOSIS  Final diagnoses:   Palpitations   Rhinovirus infection               Latest Documented Vital Signs:  As of 20:10 EST  BP- 151/83 HR- 81 Temp- 97.3 °F (36.3 °C) (Tympanic) O2 sat- 97%       Suzie Oliveira PA  11/11/20 2011

## 2020-11-11 NOTE — ED NOTES
Pt presents to ED with complains of palpations this morning. Pt reports it has gotten up to 160's earlier this morning. Pt denies SOA at this time. Pt is A&OX4, able to ambulate, and in a mask at this time.      Tad Colvin, RN  11/11/20 9557

## 2020-11-11 NOTE — DISCHARGE INSTRUCTIONS
Wear the monitor as scheduled for 2 weeks.  Follow-up with Dr. Riley as scheduled.  Return to the emergency department for chest pain passing out shortness of breath persistent rapid heart rate, any concerns.

## 2020-11-11 NOTE — TELEPHONE ENCOUNTER
How long has her heart rate been elevated? Please have her go to the ER I am concerned she is in sustained SVT and needs to be evaluated urgently.

## 2020-11-11 NOTE — ED NOTES
Patient was wearing a face mask when I entered the room and they continued to wear a mask throughout our encounter. I wore PPE including gloves, eye protection, and a surgical mask whenever I was in the room with patient. Hand hygiene performed.     Carlos Castañeda  11/11/20 1120

## 2020-11-11 NOTE — TELEPHONE ENCOUNTER
PATIENT SAID HER HEART RATE JUMPED UP INTO 'S AND NOW IT IS STAYING AROUND 117 AND WILL NOT GO DOWN ANY LOWER. PATIENT WANTS TO KNOW IF THERE IS ANYTHING SHE NEEDS TO DO.     PATIENT CALL BACK: [phone]

## 2020-11-11 NOTE — ED PROVIDER NOTES
MD ATTESTATION NOTE    Patient is a 70 y.o. female who presents to the ED with complaint of palpitations. She reports that she had HR of 160 BPM at 0830 which lasted for about 30 minutes. She denies CP, SOB, and dizziness during this episode. Her palpitations are now resolved. She currently has no complaints or concerns.    Medical Record Review:  Pt had a Holter monitor placed on 10/02/20 by Dr. Kennedy for palpitations that showed 8 episodes of SVT that were brief. She also had an echocardiogram done on 10/02/20 which showed EF of 53%.    On exam,  Patient was placed in face mask in first look. Patient was wearing facemask when I entered the room and throughout our encounter. I wore full protective equipment throughout this patient encounter including a N95 mask, eye shield, gown and gloves. Hand hygiene was performed before donning protective equipment and after removal when leaving the room.    Constitutional: Alert and oriented x3  HENT: Moist mucus membrane  Cardiovascular: RRR, HR is 90 BPM  Pulmonary: CTAB  Abdomen: Soft, non-tender  Musculoskeletal: No pedal edema bilaterally  Neurological: Normal neuro exam    Labs and imaging reviewed.     Procedure:  EKG    EKG time: 1108  Rhythm/Rate: NSR, rate 83 BPM  No Acute Ischemia  Non-Specific ST-T changes  Normal intervals     Unchanged compared to prior on 09/23/2020    Interpreted Contemporaneously by me.  Independently viewed by me    Plan: Continue to monitor. Consult her cardiologist.  ED Course as of Nov 12 1032   Wed Nov 11, 2020   1251 TSH Baseline: 0.669 [KA]   1251 Troponin T: <0.010 [KA]   1251 Magnesium: 2.3 [KA]   1251 WBC: 5.36 [KA]   1252 My interpretation of the chest x-ray is no acute infiltrate, no cardiomegaly.    [KA]   1252 My interpretation of the EKG performed at 1108 is sinus rhythm rate 83 normal axis normal intervals no ST elevation or ischemic ST-T wave changes.  No significant change from prior on 9/18/2020.    [KA]   1345 On 10/2/2020  patient had transthoracic echocardiogram that showed an EF of 63% with mild mitral valve regurgitation and mild tricuspid valve regurgitation.  Carotid duplex ultrasound showed normal proximal right ICA and proximal left ICA plaque without significant stenosis.  48-hour Holter monitor showed predominantly sinus rhythm, rare PACs, 8 episodes of SVT, the longest and fastest lasted 66 beats with a rate of 167 bpm.  Rare PVCs, no episodes of V. tach.    [KA]   1345 Human Rhinovirus/Enterovirus(!): Detected [KA]   1441 Discussed patient with Dr. Rliey, Cardiologist.  He would like for the patient to have a 2-week Zio patch placed prior to discharge from the ER and follow-up with him as scheduled on the 23rd.    [KA]   1453 I reassessed the patient, she is resting comfortably.  Vital signs stable on the monitor.  No recurrent palpitations here, work-up unremarkable save for positive human rhinovirus.  Cautions for this discussed.  I advised her of my discussion with Dr. Riley, the plan for 2-week Zio patch and follow-up with him as scheduled.  She is agreeable.  She will return for chest pain, shortness of breath, sustained palpitations or passing out and is stable for discharge after application of her Zio patch.    [KA]      ED Course User Index  [KA] Suzie Oliveira PA MD ATTESTATION NOTE  The DAVID and I have discussed this patient's history, physical exam, and treatment plan.  I have reviewed the documentation and personally had a face to face interaction with the patient. I affirm the documentation and agree with the treatment and plan.  The attached note describes my personal findings.       EMR Dragon/Transcription disclaimer:   Much of this encounter note is an electronic transcription/translation of spoken language to printed text.         Documented by nicole Krishnamurthy for Dr. Elan Colon. The scribe documented verbatim dictation from Dr. Elan Colon, and was not physically present  during this encounter. Information recorded by the scribe was done at my direction and has been verified and validated by me.       Kenya Krishnamurthy  11/11/20 1233       Elan Colon MD  11/12/20 1037

## 2020-11-11 NOTE — TELEPHONE ENCOUNTER
Called patient, she has a cold, I called and informed her (per Swetha) to go to an Urgent Care to be evaluated and tested for Covid.  Patient is aware and is going now

## 2020-11-23 ENCOUNTER — OFFICE VISIT (OUTPATIENT)
Dept: CARDIOLOGY | Facility: CLINIC | Age: 70
End: 2020-11-23

## 2020-11-23 VITALS
WEIGHT: 162.2 LBS | HEART RATE: 75 BPM | SYSTOLIC BLOOD PRESSURE: 124 MMHG | BODY MASS INDEX: 24.58 KG/M2 | DIASTOLIC BLOOD PRESSURE: 74 MMHG | OXYGEN SATURATION: 99 % | HEIGHT: 68 IN

## 2020-11-23 DIAGNOSIS — R00.2 PALPITATIONS: Primary | ICD-10-CM

## 2020-11-23 DIAGNOSIS — I47.1 PAROXYSMAL SVT (SUPRAVENTRICULAR TACHYCARDIA) (HCC): ICD-10-CM

## 2020-11-23 DIAGNOSIS — R55 SYNCOPE AND COLLAPSE: ICD-10-CM

## 2020-11-23 PROCEDURE — 99204 OFFICE O/P NEW MOD 45 MIN: CPT | Performed by: INTERNAL MEDICINE

## 2020-11-23 RX ORDER — ATENOLOL 25 MG/1
25 TABLET ORAL DAILY PRN
Qty: 30 TABLET | Refills: 1 | Status: SHIPPED | OUTPATIENT
Start: 2020-11-23 | End: 2022-08-09 | Stop reason: SDUPTHER

## 2020-11-23 RX ORDER — ATENOLOL 25 MG/1
25 TABLET ORAL DAILY
Qty: 30 TABLET | Refills: 1 | Status: SHIPPED | OUTPATIENT
Start: 2020-11-23 | End: 2020-11-23

## 2020-11-24 DIAGNOSIS — R73.03 PREDIABETES: ICD-10-CM

## 2020-11-24 DIAGNOSIS — E03.9 ACQUIRED HYPOTHYROIDISM: ICD-10-CM

## 2020-11-24 DIAGNOSIS — I10 BENIGN ESSENTIAL HYPERTENSION: Primary | ICD-10-CM

## 2020-11-24 DIAGNOSIS — E78.2 MIXED HYPERLIPIDEMIA: ICD-10-CM

## 2020-11-24 LAB — HBA1C MFR BLD: 6.2 % (ref 4.8–5.6)

## 2020-11-27 NOTE — PROGRESS NOTES
Date of Office Visit: 2020  Encounter Provider: Blade Riley MD  Place of Service: [facility] CARDIOLOGY  Patient Name: Krys Slade  :1950    Chief complaint: Syncope, palpitations, SVT.    History of Present Illness:    I had the pleasure of seeing the patient in cardiology office on 2020.  She is a very pleasant 70 year-old female with a history of hypertension who presents for evaluation.  The patient had an episode of syncope on 2020.  She was at [employer], and was outside pulling weeds.  She came in and sat down, and very quickly got lightheaded with very little prodrome.  She lost consciousness quickly while seated.  She has not had any further episodes.  She did have a carotid Doppler ultrasound, 48-hour Holter monitor, and echocardiogram performed on 10/2/2020.  The 48-hour Holter monitor showed 8 episodes of SVT, with the longest and fastest being 66 beats in length with a rate of 167 bpm.  Her carotid ultrasound was essentially normal.  Her echocardiogram showed an ejection fraction of 64% with mild mitral regurgitation, as well as a negative saline study.  She has had episodes where she feels her heart racing since that time, including one episode on 2020 which lasted 30 to 60 minutes.  She went to the emergency department at that time, although by the time she arrived, she was back in sinus rhythm.  She had a Zio patch placed at that time.  Her father does have a history of coronary artery disease, and had a four-vessel CABG at age 64.  Both her mother and sister have a history of atrial fibrillation.    Past Medical History:   Diagnosis Date   • Colon polyp    • Disease of thyroid gland    • Hyperlipidemia    • Hypertension    • Lung mass    • Palpitations    • Tonsillitis        Past Surgical History:   Procedure Laterality Date   • COLONOSCOPY N/A 2018    Procedure: COLONOSCOPY TO CECUM;  Surgeon: Yelena Mcduffie MD;  Location:  "[facility] ENDOSCOPY;  Service: General   • HYSTERECTOMY     • MOHS SURGERY     • TONSILLECTOMY         Current Outpatient Medications on File Prior to Visit   Medication Sig Dispense Refill   • calcium carbonate (OS-EMILIA) 600 MG tablet Take 600 mg by mouth daily.     • Cholecalciferol (VITAMIN D3) 2000 UNITS tablet Take  by mouth.     • ESTRACE VAGINAL 0.1 MG/GM vaginal cream      • Glucosamine-Chondroit-Vit C-Mn (GLUCOSAMINE 1500 COMPLEX PO) Take  by mouth.     • lisinopril (PRINIVIL,ZESTRIL) 20 MG tablet Take 2 tablets by mouth Daily. 90 tablet 3   • psyllium (METAMUCIL) 58.6 % packet Take 1 packet by mouth daily.     • Synthroid 75 MCG tablet TAKE 1 TABLET DAILY 90 tablet 3     No current facility-administered medications on file prior to visit.      Allergies as of 11/23/2020   • (No Known Allergies)     Social History     Socioeconomic History   • Marital status:      Spouse name: Not on file   • Number of children: 2   • Years of education: Not on file   • Highest education level: Not on file   Occupational History   • Occupation: retired teacher   Tobacco Use   • Smoking status: Never Smoker   • Smokeless tobacco: Never Used   Substance and Sexual Activity   • Alcohol use: Yes     Comment: occassional    • Drug use: No   • Sexual activity: Defer     Family History   Problem Relation Age of Onset   • Heart valve disorder Mother    • Stroke Mother    • Atrial fibrillation Mother    • Coronary artery disease Father         Father with a four-vessel CABG at age 64   • Macular degeneration Father    • Colon cancer Brother    • Atrial fibrillation Sister        Review of Systems   Constitution: Positive for malaise/fatigue.   Cardiovascular: Positive for palpitations and syncope.   All other systems reviewed and are negative.     Objective:     Vitals:    11/23/20 0942   BP: 124/74   Pulse: 75   SpO2: 99%   Weight: 73.6 kg (162 lb 3.2 oz)   Height: 172.7 cm (67.99\")     Body mass index is 24.67 " kg/m².    Constitutional:       Appearance: Healthy appearance. Well-developed.   Eyes:      Conjunctiva/sclera: Conjunctivae normal.   HENT:      Head: Normocephalic and atraumatic.   Neck:      Musculoskeletal: Neck supple.   Pulmonary:      Effort: Pulmonary effort is normal.      Breath sounds: Normal breath sounds.   Cardiovascular:      Normal rate. Regular rhythm.      Murmurs: There is no murmur.      No gallop.   Edema:     Peripheral edema absent.   Abdominal:      Palpations: Abdomen is soft.      Tenderness: There is no abdominal tenderness.   Skin:     General: Skin is warm.   Neurological:      Mental Status: Alert and oriented to person, place, and time.   Psychiatric:         Behavior: Behavior normal.       Lab Review:   Procedures    Cardiac Procedures:  1.  48-hour Holter monitor on 10/2/2020: There were 8 episodes of SVT.  The longest and fastest was 66 beats in length with a rate of 167 bpm.  2.  Echocardiogram on 10/2/2020: The ejection fraction was 64%.  There was mild mitral regurgitation.  There was mild tricuspid regurgitation.  The saline study was negative.    Assessment:       Diagnosis Plan   1. Palpitations     2. Paroxysmal SVT (supraventricular tachycardia) (CMS/HCC)     3. Syncope and collapse       Plan:       I am somewhat concerned about the syncopal episode.  It occurred while seated, had very little warning, and she lost consciousness quickly.  This always is concerning for a primary rhythm issue.  She did not feel palpitations on this occasion.  She has a Zio patch on currently which was placed on 11/11/2020, and this will be followed up on.  If she has any recurrence of syncope in the future, I would recommend a tipple.meq loop recorder (if the Zio patch is unrevealing).  Her carotid Doppler ultrasound and echocardiogram were both relatively benign.  She did have SVT on her 48-hour Holter monitor, with the longest episode being 66 beats in length with a rate of 167 bpm.  It is  uncertain as to whether this could be tied to her syncopal episode.  I did give her atenolol to be taken on an as-needed basis if she has an episode in the future like the one she went to the ER with.  Atrial fibrillation is always a consideration, as both her mother and sister have atrial fibrillation as well.  I also told her to cough or use a Valsalva maneuver if she has recurrent severe palpitations.  I will follow up on her Zio patch, and determine the next best course of action afterwards.

## 2020-11-30 ENCOUNTER — OFFICE VISIT (OUTPATIENT)
Dept: INTERNAL MEDICINE | Facility: CLINIC | Age: 70
End: 2020-11-30

## 2020-11-30 VITALS
WEIGHT: 162 LBS | SYSTOLIC BLOOD PRESSURE: 126 MMHG | TEMPERATURE: 97.5 F | BODY MASS INDEX: 24.55 KG/M2 | DIASTOLIC BLOOD PRESSURE: 70 MMHG | HEIGHT: 68 IN

## 2020-11-30 DIAGNOSIS — I10 BENIGN ESSENTIAL HYPERTENSION: Primary | ICD-10-CM

## 2020-11-30 DIAGNOSIS — R73.03 PREDIABETES: ICD-10-CM

## 2020-11-30 DIAGNOSIS — E03.9 ACQUIRED HYPOTHYROIDISM: ICD-10-CM

## 2020-11-30 LAB
T4 FREE SERPL-MCNC: 1.22 NG/DL (ref 0.93–1.7)
TSH SERPL DL<=0.005 MIU/L-ACNC: 1.21 UIU/ML (ref 0.27–4.2)

## 2020-11-30 PROCEDURE — 99213 OFFICE O/P EST LOW 20 MIN: CPT | Performed by: PHYSICIAN ASSISTANT

## 2020-11-30 RX ORDER — LISINOPRIL 40 MG/1
40 TABLET ORAL DAILY
Qty: 90 TABLET | Refills: 3 | Status: SHIPPED | OUTPATIENT
Start: 2020-11-30 | End: 2021-11-11

## 2020-11-30 NOTE — PROGRESS NOTES
Subjective   Chief Complaint   Patient presents with   • Hypertension     follow up       History of Present Illness     Pt is here today for fup on her htn. She has been tolerating increased dose of Lisinopril 40 mg daily. She saw Dr. David, cardiology, after her abnormal Holter and syncopal episode. She just turned in her Zio patch and is awaiting the reading on this. If her Zio is normal he is considering a loop recorder. She has not had any further syncopal episodes. Dr. David sent in atenolol to take prn for tachycardia, she has not needed it yet.      Patient Active Problem List   Diagnosis   • Benign essential hypertension   • Hyperlipidemia   • Hypothyroidism   • Lung mass   • Prediabetes   • Abnormal CBC   • Family history of colon cancer   • Colon cancer screening       No Known Allergies    Current Outpatient Medications on File Prior to Visit   Medication Sig Dispense Refill   • atenolol (TENORMIN) 25 MG tablet Take 1 tablet by mouth Daily As Needed (Palpitations, fast heart rate). 30 tablet 1   • calcium carbonate (OS-EMILIA) 600 MG tablet Take 600 mg by mouth daily.     • Cholecalciferol (VITAMIN D3) 2000 UNITS tablet Take  by mouth.     • ESTRACE VAGINAL 0.1 MG/GM vaginal cream      • Glucosamine-Chondroit-Vit C-Mn (GLUCOSAMINE 1500 COMPLEX PO) Take  by mouth.     • psyllium (METAMUCIL) 58.6 % packet Take 1 packet by mouth daily.     • Synthroid 75 MCG tablet TAKE 1 TABLET DAILY 90 tablet 3   • [DISCONTINUED] lisinopril (PRINIVIL,ZESTRIL) 20 MG tablet Take 2 tablets by mouth Daily. 90 tablet 3     No current facility-administered medications on file prior to visit.        Past Medical History:   Diagnosis Date   • Colon polyp    • Disease of thyroid gland    • Hyperlipidemia    • Hypertension    • Lung mass    • Palpitations    • Tonsillitis        Family History   Problem Relation Age of Onset   • Heart valve disorder Mother    • Stroke Mother    • Atrial fibrillation Mother    • Coronary  "artery disease Father         Father with a four-vessel CABG at age 64   • Macular degeneration Father    • Colon cancer Brother    • Atrial fibrillation Sister        Social History     Socioeconomic History   • Marital status:      Spouse name: Not on file   • Number of children: 2   • Years of education: Not on file   • Highest education level: Not on file   Occupational History   • Occupation: retired teacher   Tobacco Use   • Smoking status: Never Smoker   • Smokeless tobacco: Never Used   Substance and Sexual Activity   • Alcohol use: Yes     Comment: occassional    • Drug use: No   • Sexual activity: Defer       Past Surgical History:   Procedure Laterality Date   • COLONOSCOPY N/A 11/5/2018    Procedure: COLONOSCOPY TO CECUM;  Surgeon: Yelena Mcduffie MD;  Location: [facility] ENDOSCOPY;  Service: General   • HYSTERECTOMY     • MOHS SURGERY     • TONSILLECTOMY           The following portions of the patient's history were reviewed and updated as appropriate: problem list, allergies, current medications, past medical history, past family history, past social history and past surgical history.    Review of Systems   Cardiovascular: Negative for chest pain and syncope.       Immunization History   Administered Date(s) Administered   • Flu Mist 11/07/2013, 09/16/2014, 10/15/2015   • Fluad Quad 65+ 10/01/2019   • Fluzone High Dose =>65 Years ([employer] ONLY) 10/12/2016, 10/19/2017   • Pneumococcal Conjugate 13-Valent (PCV13) 04/26/2016   • Pneumococcal Polysaccharide (PPSV23) 09/16/2014   • Shingrix 10/02/2019, 10/27/2019, 12/06/2019   • Tdap 01/01/2012   • Zostavax 11/12/2009       Objective   Vitals:    11/30/20 0912 11/30/20 0925   BP:  126/70   Temp: 97.5 °F (36.4 °C)    Weight: 73.5 kg (162 lb)    Height: 172.7 cm (67.99\")      Body mass index is 24.64 kg/m².  Physical Exam  Vitals signs reviewed.   Constitutional:       Appearance: Normal appearance.   HENT:      Head: Normocephalic and atraumatic. "   Cardiovascular:      Rate and Rhythm: Normal rate and regular rhythm.      Heart sounds: Normal heart sounds.   Neurological:      Mental Status: She is alert.           Assessment/Plan   Diagnoses and all orders for this visit:    1. Benign essential hypertension (Primary)  -     lisinopril (PRINIVIL,ZESTRIL) 40 MG tablet; Take 1 tablet by mouth Daily.  Dispense: 90 tablet; Refill: 3    2. Acquired hypothyroidism  -     TSH  -     T4, Free    3. Prediabetes    1. HTN: Well controlled.     2. Hypothyroidism: Borderline last time, recheck today.     3. Syncope with SVT on Holter: Zio patch recently turned in for interpretation by cardiology. Has fup with Dr. David after.     4. Prediabetes: A1c is 6.2 advised to cut out fruit juices.     Return in about 4 months (around 3/30/2021) for Lab Today, Lab Appt Before FUP.           Answers for HPI/ROS submitted by the patient on 11/27/2020   What is the primary reason for your visit?: Other  Please describe your symptoms.: CHECK TSH, Rapid heartbeat, A1C.  Yikes!  Have you had these symptoms before?: Yes  How long have you been having these symptoms?: Greater than 2 weeks  Please describe any probable cause for these symptoms. : Could my thyroid have any connections to occasional  rapid heart rate, higher A1C, blood pressure sporadically high?

## 2020-12-05 PROCEDURE — 0298T HOLTER MONITOR - 72 HOUR UP TO 21 DAY: CPT | Performed by: INTERNAL MEDICINE

## 2020-12-11 ENCOUNTER — TELEPHONE (OUTPATIENT)
Dept: CARDIOLOGY | Facility: CLINIC | Age: 70
End: 2020-12-11

## 2020-12-14 NOTE — TELEPHONE ENCOUNTER
Requesting refill for a 90 day supply for Eliquis 5 mg to [facility].   Martin AMIN    Patient/Caregiver requests family/friend to interpret.

## 2020-12-21 ENCOUNTER — OFFICE VISIT (OUTPATIENT)
Dept: CARDIOLOGY | Facility: CLINIC | Age: 70
End: 2020-12-21

## 2020-12-21 VITALS
DIASTOLIC BLOOD PRESSURE: 65 MMHG | SYSTOLIC BLOOD PRESSURE: 123 MMHG | BODY MASS INDEX: 23.57 KG/M2 | WEIGHT: 155 LBS | HEART RATE: 65 BPM

## 2020-12-21 DIAGNOSIS — R00.2 PALPITATIONS: ICD-10-CM

## 2020-12-21 DIAGNOSIS — I48.0 PAROXYSMAL ATRIAL FIBRILLATION (HCC): Primary | ICD-10-CM

## 2020-12-21 PROCEDURE — 99442 PR PHYS/QHP TELEPHONE EVALUATION 11-20 MIN: CPT | Performed by: NURSE PRACTITIONER

## 2020-12-21 NOTE — PROGRESS NOTES
[facility] CARDIOLOGY  [address]  [address]  [facility] [address]  Phone: [phone]      Patient Name: Krys Slade  :1950  Age: 70 y.o.  Primary Cardiologist: William Riley MD  Encounter Provider:  BRAULIO Murray      Chief Complaint:   Chief Complaint   Patient presents with   • Palpitations     1 wk hosp f/u         HPI  Krys Slade is a 70 y.o. female who presents today via telephone visit secondary to COVID pandemic. Patient presents today for reevaluation of atrial fibrillation.  Patient is new to me but I have reviewed prior medical records    Pt has a  history significant for new onset atrial fibrillation, syncope.    Patient was a new patient with Dr. Riley in 2020.  At that time she was being seen for an episode of syncope.  A ZIO monitor was placed which subsequently led to the diagnosis of atrial fibrillation.  Patient was started on anticoagulation by Dr. Riley 1 week ago.  Patient presents today for reevaluation.  Patient states that she has very brief and intermittent episodes of heart palpitations.  She reports that these only last for a few seconds.  She reports that she is currently taking apixaban without any bleeding complications specifically she denies hematuria, melena, epistaxis.  She reports that overall she is feeling well.  She has not had any further episodes of lightheadedness or syncope.      The following portions of the patient's history were reviewed and updated as appropriate: allergies, current medications, past family history, past medical history, past social history, past surgical history and problem list.      Review of Systems   Constitution: Negative for malaise/fatigue.   Cardiovascular: Positive for palpitations. Negative for chest pain and leg swelling.   Respiratory: Negative for shortness of breath.    Neurological: Negative for light-headedness.   All other systems reviewed and are  negative.      OBJECTIVE:     Vitals:    12/21/20 1006   BP: 123/65   Pulse: 65   Weight: 70.3 kg (155 lb)     Body mass index is 23.57 kg/m².    Physical Exam  Physical exam not performed secondary to telephone visit.    Procedures    Cardiac Procedures:  1. Echocardiogram 10/2/2020.  LVEF 63%.  LV systolic function is normal.  Mild mitral valve regurgitation.  Mild tricuspid valve regurgitation.  Estimated RVSP less than 35 mmHg.  2. Carotid duplex 10/2/2020.  Proximal left carotid with plaque but no significant stenosis.  3. 48-hour Holter 10/5/2020.  Relatively benign monitor study.  4. ZIO monitor 12/5/2020.  Episodes of SVT with irregularity in rhythm suggesting atrial fibrillation.  Dr. Riley personally reviewed the tracings and diagnosed patient with atrial fibrillation.    ASSESSMENT:      Diagnosis Plan   1. Paroxysmal atrial fibrillation (CMS/HCC)     2. Palpitations           PLAN OF CARE:     1. PAF.  Patient was recently diagnosed with paroxysmal atrial fibrillation on a ZIO monitor dated 12/5/2020.  Patient has not had any subsequent episodes of tachycardias.  She reports occasional episodes of heart palpitations that are very brief.  She denies any episodes of shortness of breath, chest discomfort, lightheadedness or near syncope.  She is currently using atenolol as needed only as a pill in pocket type of regimen and has not needed to take any doses.  Patient is anticoagulated with apixaban 5 mg twice per day and is not having any bleeding complications.  She will continue this regimen.  2. Palpitations.  As above.  Patient has as needed atenolol.  3. Keep your previously scheduled appointment with Dr. Riley in January 2021.    This patient has consented to a telehealth visit via telephone. I spent 13 minutes in total including but not limited to the direct conversation with this patient. All vitals recorded within this visit are reported by the patient.         Discharge Medications           Accurate as of December 21, 2020 11:26 AM. If you have any questions, ask your nurse or doctor.            Continue These Medications      Instructions Start Date   apixaban 5 MG tablet tablet  Commonly known as: ELIQUIS   5 mg, Oral, Every 12 Hours Scheduled      atenolol 25 MG tablet  Commonly known as: TENORMIN   25 mg, Oral, Daily PRN      calcium carbonate 600 MG tablet  Commonly known as: OS-EMILIA   600 mg, Oral, Daily      ESTRACE VAGINAL 0.1 MG/GM vaginal cream  Generic drug: estradiol   No dose, route, or frequency recorded.      GLUCOSAMINE 1500 COMPLEX PO   Oral      lisinopril 40 MG tablet  Commonly known as: PRINIVIL,ZESTRIL   40 mg, Oral, Daily      psyllium 58.6 % packet  Commonly known as: METAMUCIL   1 packet, Oral, Daily      Synthroid 75 MCG tablet  Generic drug: levothyroxine   TAKE 1 TABLET DAILY      Vitamin D3 50 MCG (2000 UT) tablet   Oral             Thank you for allowing me to participate in the care of your patient,         BRAULIO Murray  [facility]  12/21/20  10:56 EST      **Chris Disclaimer:**  Much of this encounter note is an electronic transcription/translation of spoken language to printed text. The electronic translation of spoken language may permit erroneous, or at times, nonsensical words or phrases to be inadvertently transcribed. Although I have reviewed the note for such errors, some may still exist.

## 2021-01-27 ENCOUNTER — OFFICE VISIT (OUTPATIENT)
Dept: CARDIOLOGY | Facility: CLINIC | Age: 71
End: 2021-01-27

## 2021-01-27 VITALS
DIASTOLIC BLOOD PRESSURE: 69 MMHG | SYSTOLIC BLOOD PRESSURE: 131 MMHG | HEART RATE: 63 BPM | WEIGHT: 154 LBS | BODY MASS INDEX: 23.42 KG/M2

## 2021-01-27 DIAGNOSIS — I47.1 PAROXYSMAL SVT (SUPRAVENTRICULAR TACHYCARDIA) (HCC): ICD-10-CM

## 2021-01-27 DIAGNOSIS — I48.0 PAROXYSMAL ATRIAL FIBRILLATION (HCC): Primary | ICD-10-CM

## 2021-01-27 DIAGNOSIS — R55 SYNCOPE AND COLLAPSE: ICD-10-CM

## 2021-01-27 PROCEDURE — 99442 PR PHYS/QHP TELEPHONE EVALUATION 11-20 MIN: CPT | Performed by: NURSE PRACTITIONER

## 2021-01-27 NOTE — PROGRESS NOTES
[facility] CARDIOLOGY  [address]  [address]  [facility] [address]  Phone: [phone]      Patient Name: Krys Slade  :1950  Age: 71 y.o.  Primary Cardiologist: William Riley MD  Encounter Provider:  BRAULIO Murray      Chief Complaint:   Chief Complaint   Patient presents with   • Atrial Fibrillation     4 wk f/u          HPI  Krys Slade is a 71 y.o. female who presents today via telephone visit secondary to COVID pandemic. Patient presents today for 1 month reevaluation.     Pt has a  history significant for atrial fibrillation, syncope.    Patient was evaluated by me 1 month ago where she had findings of new onset atrial fibrillation on a ZIO monitor.  At that time she was initiated on apixaban for anticoagulation.  She does have atenolol to use as needed for elevated heart rates.  Patient states that she has done well over the past month.  She has not needed to take any doses of atenolol and states that heart rate has remained controlled.  She does not notice any adverse effects of apixaban such as blood in the stool, blood in urine, nosebleeds.  Overall patient states that she is feeling well.  She has not had any further episodes of near syncope or syncope.  She denies any chest discomfort, shortness of breath, lower extremity edema, fatigue.      The following portions of the patient's history were reviewed and updated as appropriate: allergies, current medications, past family history, past medical history, past social history, past surgical history and problem list.      Review of Systems   Constitution: Negative for malaise/fatigue.   Cardiovascular: Negative for chest pain and leg swelling.   Respiratory: Negative for shortness of breath.    Neurological: Negative for light-headedness.   All other systems reviewed and are negative.      OBJECTIVE:     Vitals:    21 1341   BP: 131/69   Pulse: 63   Weight: 69.9 kg (154 lb)     Body mass  index is 23.42 kg/m².    Physical Exam  Physical exam not performed secondary to telephone visit.    Procedures    Cardiac Procedures:  1. Echocardiogram 10/2/2020.  LVEF 63%.  LV systolic function is normal.  Mild mitral valve regurgitation.  Mild tricuspid valve regurgitation.  Estimated RVSP less than 35 mmHg.  2. Carotid duplex 10/2/2020.  Proximal left carotid with plaque but no significant stenosis.  3. 48-hour Holter 10/5/2020.  Relatively benign monitor study.  4. ZIO monitor 12/5/2020.  Episodes of SVT with irregularity in rhythm suggesting atrial fibrillation.  Dr. Riley personally reviewed the tracings and diagnosed patient with atrial fibrillation.    ASSESSMENT:      Diagnosis Plan   1. Paroxysmal atrial fibrillation (CMS/[facility])     2. Paroxysmal SVT (supraventricular tachycardia) (CMS/[facility])     3. Syncope and collapse           PLAN OF CARE:     1. Paroxysmal atrial fibrillation.  Patient currently asymptomatic and denies chest discomfort, shortness of breath, tachycardia, fatigue.  She has atenolol to use as needed for elevated heart rate and states that she has not needed to take any doses of this medication.  She is anticoagulated with apixaban and not having any bleeding complications.  She will continue current regimen.  2. Paroxysmal SVT.  As noted above no need for use of atenolol for elevated heart rates.  3. History of syncope.  Patient denies any subsequent episodes of near syncope, lightheadedness or syncope.  4. Follow-up with Dr. Riley in 6 months.  Sooner for problems or complications.    This patient has consented to a telehealth visit via telephone. I spent 11 minutes in total including but not limited to the direct conversation with this patient. All vitals recorded within this visit are reported by the patient.         Discharge Medications          Accurate as of January 27, 2021 11:59 PM. If you have any questions, ask your nurse or doctor.            Continue These  Medications      Instructions Start Date   apixaban 5 MG tablet tablet  Commonly known as: ELIQUIS   5 mg, Oral, Every 12 Hours Scheduled      atenolol 25 MG tablet  Commonly known as: TENORMIN   25 mg, Oral, Daily PRN      calcium carbonate 600 MG tablet  Commonly known as: OS-EMILIA   600 mg, Oral, Daily      ESTRACE VAGINAL 0.1 MG/GM vaginal cream  Generic drug: estradiol   No dose, route, or frequency recorded.      GLUCOSAMINE 1500 COMPLEX PO   Oral      lisinopril 40 MG tablet  Commonly known as: PRINIVIL,ZESTRIL   40 mg, Oral, Daily      psyllium 58.6 % packet  Commonly known as: METAMUCIL   1 packet, Oral, Daily      Synthroid 75 MCG tablet  Generic drug: levothyroxine   TAKE 1 TABLET DAILY      Vitamin D3 50 MCG (2000 UT) tablet   Oral             Thank you for allowing me to participate in the care of your patient,         BRAULIO Murray  [facility]  01/28/21  12:20 EST      **Chris Disclaimer:**  Much of this encounter note is an electronic transcription/translation of spoken language to printed text. The electronic translation of spoken language may permit erroneous, or at times, nonsensical words or phrases to be inadvertently transcribed. Although I have reviewed the note for such errors, some may still exist.

## 2021-03-23 DIAGNOSIS — E78.2 MIXED HYPERLIPIDEMIA: ICD-10-CM

## 2021-03-23 DIAGNOSIS — E03.9 ACQUIRED HYPOTHYROIDISM: ICD-10-CM

## 2021-03-23 DIAGNOSIS — R73.03 PREDIABETES: ICD-10-CM

## 2021-03-23 DIAGNOSIS — R79.89 ABNORMAL CBC: ICD-10-CM

## 2021-03-23 DIAGNOSIS — I10 BENIGN ESSENTIAL HYPERTENSION: Primary | ICD-10-CM

## 2021-03-23 LAB
ALBUMIN SERPL-MCNC: 4.2 G/DL (ref 3.5–5.2)
ALBUMIN/GLOB SERPL: 2.1 G/DL
ALP SERPL-CCNC: 66 U/L (ref 39–117)
ALT SERPL-CCNC: 12 U/L (ref 1–33)
AST SERPL-CCNC: 11 U/L (ref 1–32)
BASOPHILS # BLD AUTO: 0.04 10*3/MM3 (ref 0–0.2)
BASOPHILS NFR BLD AUTO: 1.1 % (ref 0–1.5)
BILIRUB SERPL-MCNC: 0.3 MG/DL (ref 0–1.2)
BUN SERPL-MCNC: 19 MG/DL (ref 8–23)
BUN/CREAT SERPL: 22.4 (ref 7–25)
CALCIUM SERPL-MCNC: 9.1 MG/DL (ref 8.6–10.5)
CHLORIDE SERPL-SCNC: 104 MMOL/L (ref 98–107)
CHOLEST SERPL-MCNC: 197 MG/DL (ref 0–200)
CHOLEST/HDLC SERPL: 3.4 {RATIO}
CO2 SERPL-SCNC: 28.2 MMOL/L (ref 22–29)
CREAT SERPL-MCNC: 0.85 MG/DL (ref 0.57–1)
EOSINOPHIL # BLD AUTO: 0.07 10*3/MM3 (ref 0–0.4)
EOSINOPHIL NFR BLD AUTO: 1.9 % (ref 0.3–6.2)
ERYTHROCYTE [DISTWIDTH] IN BLOOD BY AUTOMATED COUNT: 12.9 % (ref 12.3–15.4)
GLOBULIN SER CALC-MCNC: 2 GM/DL
GLUCOSE SERPL-MCNC: 93 MG/DL (ref 65–99)
HBA1C MFR BLD: 5.9 % (ref 4.8–5.6)
HCT VFR BLD AUTO: 38.2 % (ref 34–46.6)
HDLC SERPL-MCNC: 58 MG/DL (ref 40–60)
HGB BLD-MCNC: 12.4 G/DL (ref 12–15.9)
IMM GRANULOCYTES # BLD AUTO: 0.01 10*3/MM3 (ref 0–0.05)
IMM GRANULOCYTES NFR BLD AUTO: 0.3 % (ref 0–0.5)
LDLC SERPL CALC-MCNC: 125 MG/DL (ref 0–100)
LYMPHOCYTES # BLD AUTO: 1.44 10*3/MM3 (ref 0.7–3.1)
LYMPHOCYTES NFR BLD AUTO: 39.9 % (ref 19.6–45.3)
MCH RBC QN AUTO: 30.7 PG (ref 26.6–33)
MCHC RBC AUTO-ENTMCNC: 32.5 G/DL (ref 31.5–35.7)
MCV RBC AUTO: 94.6 FL (ref 79–97)
MONOCYTES # BLD AUTO: 0.27 10*3/MM3 (ref 0.1–0.9)
MONOCYTES NFR BLD AUTO: 7.5 % (ref 5–12)
NEUTROPHILS # BLD AUTO: 1.78 10*3/MM3 (ref 1.7–7)
NEUTROPHILS NFR BLD AUTO: 49.3 % (ref 42.7–76)
NRBC BLD AUTO-RTO: 0 /100 WBC (ref 0–0.2)
PLATELET # BLD AUTO: 239 10*3/MM3 (ref 140–450)
POTASSIUM SERPL-SCNC: 4.6 MMOL/L (ref 3.5–5.2)
PROT SERPL-MCNC: 6.2 G/DL (ref 6–8.5)
RBC # BLD AUTO: 4.04 10*6/MM3 (ref 3.77–5.28)
SODIUM SERPL-SCNC: 141 MMOL/L (ref 136–145)
TRIGL SERPL-MCNC: 75 MG/DL (ref 0–150)
TSH SERPL DL<=0.005 MIU/L-ACNC: 1.25 UIU/ML (ref 0.27–4.2)
VLDLC SERPL CALC-MCNC: 14 MG/DL (ref 5–40)
WBC # BLD AUTO: 3.61 10*3/MM3 (ref 3.4–10.8)

## 2021-06-01 ENCOUNTER — OFFICE VISIT (OUTPATIENT)
Dept: INTERNAL MEDICINE | Facility: CLINIC | Age: 71
End: 2021-06-01

## 2021-06-01 VITALS
WEIGHT: 156 LBS | SYSTOLIC BLOOD PRESSURE: 128 MMHG | HEIGHT: 68 IN | DIASTOLIC BLOOD PRESSURE: 70 MMHG | BODY MASS INDEX: 23.64 KG/M2 | TEMPERATURE: 97.2 F

## 2021-06-01 DIAGNOSIS — E03.9 ACQUIRED HYPOTHYROIDISM: ICD-10-CM

## 2021-06-01 DIAGNOSIS — E78.2 MIXED HYPERLIPIDEMIA: ICD-10-CM

## 2021-06-01 DIAGNOSIS — I10 BENIGN ESSENTIAL HYPERTENSION: ICD-10-CM

## 2021-06-01 DIAGNOSIS — I48.0 PAROXYSMAL ATRIAL FIBRILLATION (HCC): ICD-10-CM

## 2021-06-01 DIAGNOSIS — R73.03 PREDIABETES: Primary | ICD-10-CM

## 2021-06-01 PROCEDURE — 1126F AMNT PAIN NOTED NONE PRSNT: CPT | Performed by: PHYSICIAN ASSISTANT

## 2021-06-01 PROCEDURE — 1159F MED LIST DOCD IN RCRD: CPT | Performed by: PHYSICIAN ASSISTANT

## 2021-06-01 PROCEDURE — G0439 PPPS, SUBSEQ VISIT: HCPCS | Performed by: PHYSICIAN ASSISTANT

## 2021-06-01 NOTE — PROGRESS NOTES
The ABCs of the Annual Wellness Visit  Subsequent Medicare Wellness Visit    Chief Complaint   Patient presents with   • Medicare Wellness-subsequent       Subjective   History of Present Illness:  Krys Slade is a 71 y.o. female who presents for a Subsequent Medicare Wellness Visit. She is doing great. She has no blood in her stools and no hearrburn. No CP or SOA.     HEALTH RISK ASSESSMENT    Recent Hospitalizations:  No hospitalization(s) within the last year.    Current Medical Providers:  Patient Care Team:  Swetha Britt PA-C as PCP - General (Physician Assistant)  Nguyễn Cardona MD PhD as Consulting Physician (Hematology and Oncology)  Taylor Russell MD as Consulting Physician (Obstetrics and Gynecology)    Smoking Status:  Social History     Tobacco Use   Smoking Status Never Smoker   Smokeless Tobacco Never Used       Alcohol Consumption:  Social History     Substance and Sexual Activity   Alcohol Use Yes    Comment: occassional        Depression Screen:   PHQ-2/PHQ-9 Depression Screening 6/1/2021   Little interest or pleasure in doing things 0   Feeling down, depressed, or hopeless 0   Trouble falling or staying asleep, or sleeping too much -   Feeling tired or having little energy -   Poor appetite or overeating -   Feeling bad about yourself - or that you are a failure or have let yourself or your family down -   Trouble concentrating on things, such as reading the newspaper or watching television -   Moving or speaking so slowly that other people could have noticed. Or the opposite - being so fidgety or restless that you have been moving around a lot more than usual -   Thoughts that you would be better off dead, or of hurting yourself in some way -   Total Score 0   If you checked off any problems, how difficult have these problems made it for you to do your work, take care of things at home, or get along with other people? -       Fall Risk Screen:  STEADI Fall Risk Assessment was completed,  and patient is at LOW risk for falls.Assessment completed on:6/1/2021    Health Habits and Functional and Cognitive Screening:  Functional & Cognitive Status 6/1/2021   Do you have difficulty preparing food and eating? No   Do you have difficulty bathing yourself, getting dressed or grooming yourself? No   Do you have difficulty using the toilet? No   Do you have difficulty moving around from place to place? No   Do you have trouble with steps or getting out of a bed or a chair? No   Current Diet Well Balanced Diet   Dental Exam Up to date        Dental Exam Comment -   Eye Exam Up to date        Eye Exam Comment -   Exercise (times per week) 4 times per week   Current Exercise Activities Include Walking   Do you need help using the phone?  No   Are you deaf or do you have serious difficulty hearing?  No   Do you need help with transportation? No   Do you need help shopping? No   Do you need help preparing meals?  No   Do you need help with housework?  No   Do you need help with laundry? No   Do you need help taking your medications? No   Do you need help managing money? No   Do you ever drive or ride in a car without wearing a seat belt? No   Have you felt unusual stress, anger or loneliness in the last month? -   Who do you live with? -   If you need help, do you have trouble finding someone available to you? -   Have you been bothered in the last four weeks by sexual problems? -   Do you have difficulty concentrating, remembering or making decisions? -         Does the patient have evidence of cognitive impairment? No    Asprin use counseling:Does not need ASA (and currently is not on it)    Age-appropriate Screening Schedule:  Refer to the list below for future screening recommendations based on patient's age, sex and/or medical conditions. Orders for these recommended tests are listed in the plan section. The patient has been provided with a written plan.    Health Maintenance   Topic Date Due   • DXA SCAN   12/06/2016   • MAMMOGRAM  04/10/2020   • INFLUENZA VACCINE  08/01/2021   • TDAP/TD VACCINES (2 - Td or Tdap) 01/01/2022   • LIPID PANEL  03/23/2022   • ZOSTER VACCINE  Completed          The following portions of the patient's history were reviewed and updated as appropriate: allergies, current medications, past family history, past medical history, past social history, past surgical history and problem list.    Outpatient Medications Prior to Visit   Medication Sig Dispense Refill   • apixaban (ELIQUIS) 5 MG tablet tablet Take 1 tablet by mouth Every 12 (Twelve) Hours. 180 tablet 3   • calcium carbonate (OS-EMILIA) 600 MG tablet Take 600 mg by mouth daily.     • Cholecalciferol (VITAMIN D3) 2000 UNITS tablet Take  by mouth.     • ESTRACE VAGINAL 0.1 MG/GM vaginal cream      • Glucosamine-Chondroit-Vit C-Mn (GLUCOSAMINE 1500 COMPLEX PO) Take  by mouth.     • lisinopril (PRINIVIL,ZESTRIL) 40 MG tablet Take 1 tablet by mouth Daily. 90 tablet 3   • psyllium (METAMUCIL) 58.6 % packet Take 1 packet by mouth daily.     • Synthroid 75 MCG tablet TAKE 1 TABLET DAILY 90 tablet 3   • atenolol (TENORMIN) 25 MG tablet Take 1 tablet by mouth Daily As Needed (Palpitations, fast heart rate). 30 tablet 1     No facility-administered medications prior to visit.       Patient Active Problem List   Diagnosis   • Benign essential hypertension   • Hyperlipidemia   • Hypothyroidism   • Lung mass   • Prediabetes   • Abnormal CBC   • Family history of colon cancer   • Colon cancer screening   • Paroxysmal atrial fibrillation (CMS/HCC)       Advanced Care Planning:  ACP discussion was held with the patient during this visit. Patient has an advance directive in EMR which is still valid.     Review of Systems   Respiratory: Negative for shortness of breath.    Cardiovascular: Negative for chest pain and leg swelling.   Gastrointestinal: Negative for abdominal pain, blood in stool, constipation and diarrhea.       Compared to one year ago, the  "patient feels her physical health is the same.  Compared to one year ago, the patient feels her mental health is the same.    Reviewed chart for potential of high risk medication in the elderly: yes  Reviewed chart for potential of harmful drug interactions in the elderly:yes    Objective         Vitals:    06/01/21 1006 06/01/21 1029   BP:  128/70   Temp: 97.2 °F (36.2 °C)    Weight: 70.8 kg (156 lb)    Height: 172.7 cm (68\")        Body mass index is 23.72 kg/m².  Discussed the patient's BMI with her. The BMI is in the acceptable range.    Physical Exam  Vitals reviewed.   Constitutional:       Appearance: She is well-developed.   HENT:      Head: Normocephalic and atraumatic.   Eyes:      Extraocular Movements: Extraocular movements intact.      Conjunctiva/sclera: Conjunctivae normal.      Pupils: Pupils are equal, round, and reactive to light.   Neck:      Thyroid: No thyromegaly.      Vascular: No carotid bruit.   Cardiovascular:      Rate and Rhythm: Normal rate and regular rhythm.      Heart sounds: Normal heart sounds, S1 normal and S2 normal.   Pulmonary:      Effort: Pulmonary effort is normal.      Breath sounds: Normal breath sounds.   Musculoskeletal:      Cervical back: Neck supple.   Lymphadenopathy:      Cervical: No cervical adenopathy.   Skin:     General: Skin is warm and dry.   Neurological:      General: No focal deficit present.      Mental Status: She is alert and oriented to person, place, and time.   Psychiatric:         Mood and Affect: Mood normal.         Behavior: Behavior normal.         Thought Content: Thought content normal.         Judgment: Judgment normal.         Lab Results   Component Value Date    GLU 93 03/23/2021    CHLPL 197 03/23/2021    TRIG 75 03/23/2021    HDL 58 03/23/2021     (H) 03/23/2021    VLDL 14 03/23/2021    HGBA1C 5.90 (H) 03/23/2021        Assessment/Plan   Medicare Risks and Personalized Health Plan  CMS Preventative Services Quick Reference  Advance " Directive Discussion  Breast Cancer/Mammogram Screening    The above risks/problems have been discussed with the patient.  Pertinent information has been shared with the patient in the After Visit Summary.  Follow up plans and orders are seen below in the Assessment/Plan Section.    Diagnoses and all orders for this visit:    1. Prediabetes (Primary)  Comments:  Stable.     2. Acquired hypothyroidism  Comments:  Therapeutic    3. Mixed hyperlipidemia  Comments:  Lipids to goal    4. Paroxysmal atrial fibrillation (CMS/HCC)  Comments:  Followed by cardiology    5. Benign essential hypertension      Follow Up:  Return in about 1 year (around 6/1/2022) for Medicare Wellness, Lab Appt Before FUP.     An After Visit Summary and PPPS were given to the patient.       Will have her fup in 1 year. I will ask that cardiology check her blood pressure at her fup as I will be out of office on maternity leave.         Answers for HPI/ROS submitted by the patient on 3/30/2021  What is the primary reason for your visit?: Physical

## 2021-06-08 PROBLEM — I48.0 PAROXYSMAL ATRIAL FIBRILLATION: Status: ACTIVE | Noted: 2021-06-08

## 2021-06-11 ENCOUNTER — APPOINTMENT (OUTPATIENT)
Dept: WOMENS IMAGING | Facility: HOSPITAL | Age: 71
End: 2021-06-11

## 2021-06-11 PROCEDURE — 77063 BREAST TOMOSYNTHESIS BI: CPT | Performed by: RADIOLOGY

## 2021-06-11 PROCEDURE — 77067 SCR MAMMO BI INCL CAD: CPT | Performed by: RADIOLOGY

## 2021-07-13 ENCOUNTER — APPOINTMENT (OUTPATIENT)
Dept: WOMENS IMAGING | Facility: HOSPITAL | Age: 71
End: 2021-07-13

## 2021-07-13 PROCEDURE — 77063 BREAST TOMOSYNTHESIS BI: CPT | Performed by: RADIOLOGY

## 2021-07-13 PROCEDURE — 77067 SCR MAMMO BI INCL CAD: CPT | Performed by: RADIOLOGY

## 2021-08-03 ENCOUNTER — OFFICE VISIT (OUTPATIENT)
Dept: CARDIOLOGY | Facility: CLINIC | Age: 71
End: 2021-08-03

## 2021-08-03 VITALS
HEIGHT: 68 IN | DIASTOLIC BLOOD PRESSURE: 70 MMHG | BODY MASS INDEX: 23.73 KG/M2 | OXYGEN SATURATION: 97 % | SYSTOLIC BLOOD PRESSURE: 122 MMHG | HEART RATE: 72 BPM | WEIGHT: 156.6 LBS

## 2021-08-03 DIAGNOSIS — Z82.49 FAMILY HISTORY OF CORONARY ARTERY DISEASE: ICD-10-CM

## 2021-08-03 DIAGNOSIS — I48.0 PAROXYSMAL ATRIAL FIBRILLATION (HCC): Primary | ICD-10-CM

## 2021-08-03 DIAGNOSIS — I47.1 PAROXYSMAL SVT (SUPRAVENTRICULAR TACHYCARDIA) (HCC): ICD-10-CM

## 2021-08-03 PROCEDURE — 99213 OFFICE O/P EST LOW 20 MIN: CPT | Performed by: INTERNAL MEDICINE

## 2021-08-03 NOTE — PROGRESS NOTES
Date of Office Visit: 2021  Encounter Provider: Blade Riley MD  Place of Service: [facility] CARDIOLOGY  Patient Name: Krys Slade  :1950    Chief complaint: Follow-up for paroxysmal atrial fibrillation, SVT, and family history of coronary artery disease.    History of Present Illness:    I again had the pleasure of seeing the patient in cardiology office on 8/3/2021.  She is a very pleasant 71 year-old female with a history of paroxysmal atrial fibrillation, SVT, and hypertension who presents for follow-up.    I additionally saw the patient in the office on 2020.  She had an episode of syncope on 2020.  [place] had been [place] [employer], and was outside pulling weeds.  She came in and sat down, and very quickly got lightheaded and lost consciousness.  She had a carotid Doppler ultrasound, 48-hour Holter monitor, and echocardiogram performed on 10/2/2020.  The 48-hour Holter monitor showed 8 episodes of SVT, with the longest and fastest being 66 beats in length with a rate of 167 bpm.  Her carotid ultrasound was essentially normal.  Her echocardiogram showed ejection fraction of 64% and mild mitral regurgitation.  She had also been having some palpitations that were lasting up to 30 to 60 minutes at a time.  She had a Zio patch placed on 2020, which ultimately showed episodes of SVT with the longest being 1 minute and 33 seconds.  There was irregularity, suggestive of atrial fibrillation.  She was subsequently initiated on Eliquis.  Of note, she also has a family history of coronary artery disease with her father having a four-vessel CABG at age 64.    The patient presents today for follow-up.  She is not having any palpitations recently.  She has not taken any atenolol thus far.  She is doing well on the Eliquis.  She denied any chest pain or shortness of breath.    Past Medical History:   Diagnosis Date   • Colon polyp    • Disease of thyroid gland    •  Hyperlipidemia    • Hypertension    • Lung mass    • Palpitations    • Tonsillitis        Past Surgical History:   Procedure Laterality Date   • COLONOSCOPY N/A 11/5/2018    Procedure: COLONOSCOPY TO CECUM;  Surgeon: Yelena Mcduffie MD;  Location: [facility] ENDOSCOPY;  Service: General   • HYSTERECTOMY     • MOHS SURGERY     • TONSILLECTOMY         Current Outpatient Medications on File Prior to Visit   Medication Sig Dispense Refill   • atenolol (TENORMIN) 25 MG tablet Take 1 tablet by mouth Daily As Needed (Palpitations, fast heart rate). 30 tablet 1   • calcium carbonate (OS-EMILIA) 600 MG tablet Take 600 mg by mouth daily.     • Cholecalciferol (VITAMIN D3) 2000 UNITS tablet Take  by mouth.     • ESTRACE VAGINAL 0.1 MG/GM vaginal cream      • Glucosamine-Chondroit-Vit C-Mn (GLUCOSAMINE 1500 COMPLEX PO) Take  by mouth.     • lisinopril (PRINIVIL,ZESTRIL) 40 MG tablet Take 1 tablet by mouth Daily. 90 tablet 3   • psyllium (METAMUCIL) 58.6 % packet Take 1 packet by mouth daily.     • Synthroid 75 MCG tablet TAKE 1 TABLET DAILY 90 tablet 3   • [DISCONTINUED] apixaban (ELIQUIS) 5 MG tablet tablet Take 1 tablet by mouth Every 12 (Twelve) Hours. 180 tablet 3     No current facility-administered medications on file prior to visit.     Allergies as of 08/03/2021   • (No Known Allergies)     Social History     Socioeconomic History   • Marital status:      Spouse name: Not on file   • Number of children: 2   • Years of education: Not on file   • Highest education level: Not on file   Tobacco Use   • Smoking status: Never Smoker   • Smokeless tobacco: Never Used   Vaping Use   • Vaping Use: Never used   Substance and Sexual Activity   • Alcohol use: Yes     Comment: occassional    • Drug use: No   • Sexual activity: Defer     Family History   Problem Relation Age of Onset   • Heart valve disorder Mother    • Stroke Mother    • Atrial fibrillation Mother    • Coronary artery disease Father         Father with a four-vessel  "CABG at age 64   • Macular degeneration Father    • Colon cancer Brother    • Atrial fibrillation Sister        Review of Systems   Constitutional: Positive for malaise/fatigue.   All other systems reviewed and are negative.     Objective:     Vitals:    08/03/21 0843   Weight: 71 kg (156 lb 9.6 oz)   Height: 172.7 cm (67.99\")     Body mass index is 23.82 kg/m².    Constitutional:       Appearance: Healthy appearance. Well-developed.   Eyes:      Conjunctiva/sclera: Conjunctivae normal.   HENT:      Head: Normocephalic and atraumatic.   Pulmonary:      Effort: Pulmonary effort is normal.      Breath sounds: Normal breath sounds.   Cardiovascular:      Normal rate. Regular rhythm.      Murmurs: There is no murmur.      No gallop.   Edema:     Peripheral edema absent.   Abdominal:      Palpations: Abdomen is soft.      Tenderness: There is no abdominal tenderness.   Musculoskeletal:      Cervical back: Neck supple. Skin:     General: Skin is warm.   Neurological:      Mental Status: Alert and oriented to person, place, and time.   Psychiatric:         Behavior: Behavior normal.       Lab Review:   Procedures    Lipid Panel    Lipid Panel 3/23/21   Total Cholesterol 197   Triglycerides 75   HDL Cholesterol 58   VLDL Cholesterol 14   LDL Cholesterol  125 (A)   (A) Abnormal value       Comments are available for some flowsheets but are not being displayed.              Cardiac Procedures:  1.  48-hour Holter monitor on 10/2/2020: There were 8 episodes of SVT.  The longest and fastest was 66 beats in length with a rate of 167 bpm.  2.  Echocardiogram on 10/2/2020: The ejection fraction was 64%.  There was mild mitral regurgitation.  There was mild tricuspid regurgitation.  The saline study was negative.  3.  Zio patch starting on 11/11/2020: There were episodes of SVT, the longest being 1 minute and 33 seconds.  There was irregularity, suggesting atrial fibrillation.    Assessment:       Diagnosis Plan   1. Paroxysmal " atrial fibrillation (CMS/[facility])     2. Paroxysmal SVT (supraventricular tachycardia) (CMS/[facility])     3. Family history of coronary artery disease       Plan:       Overall, she seems to be doing very well.  She has had no significant palpitations since her last visit.  She is only taking the atenolol as needed, and actually has not needed to take 1 thus far.  She is doing well taking the Eliquis at 5 mg twice a day.  She is not having any bleeding issues.  Her blood pressure is excellent today at 122/70 on the lisinopril.  For now, I did not change any medications.  I will see her back in the office in 1 year unless other issues arise.

## 2021-09-28 DIAGNOSIS — E03.9 ACQUIRED HYPOTHYROIDISM: ICD-10-CM

## 2021-09-29 RX ORDER — LEVOTHYROXINE SODIUM 75 MCG
TABLET ORAL
Qty: 90 TABLET | Refills: 3 | Status: SHIPPED | OUTPATIENT
Start: 2021-09-29 | End: 2022-06-10

## 2021-11-10 DIAGNOSIS — I10 BENIGN ESSENTIAL HYPERTENSION: ICD-10-CM

## 2021-11-11 RX ORDER — LISINOPRIL 40 MG/1
TABLET ORAL
Qty: 90 TABLET | Refills: 3 | Status: SHIPPED | OUTPATIENT
Start: 2021-11-11 | End: 2022-11-07

## 2022-05-26 DIAGNOSIS — R73.03 PREDIABETES: ICD-10-CM

## 2022-05-26 DIAGNOSIS — R79.89 ABNORMAL CBC: ICD-10-CM

## 2022-05-26 DIAGNOSIS — E03.9 ACQUIRED HYPOTHYROIDISM: ICD-10-CM

## 2022-05-26 DIAGNOSIS — I10 BENIGN ESSENTIAL HYPERTENSION: Primary | ICD-10-CM

## 2022-05-26 DIAGNOSIS — E78.2 MIXED HYPERLIPIDEMIA: ICD-10-CM

## 2022-05-27 LAB
ALBUMIN SERPL-MCNC: 4.3 G/DL (ref 3.7–4.7)
ALBUMIN/GLOB SERPL: 2.2 {RATIO} (ref 1.2–2.2)
ALP SERPL-CCNC: 71 IU/L (ref 44–121)
ALT SERPL-CCNC: 10 IU/L (ref 0–32)
AST SERPL-CCNC: 11 IU/L (ref 0–40)
BASOPHILS # BLD AUTO: 0 X10E3/UL (ref 0–0.2)
BASOPHILS NFR BLD AUTO: 1 %
BILIRUB SERPL-MCNC: 0.3 MG/DL (ref 0–1.2)
BUN SERPL-MCNC: 15 MG/DL (ref 8–27)
BUN/CREAT SERPL: 17 (ref 12–28)
CALCIUM SERPL-MCNC: 9.2 MG/DL (ref 8.7–10.3)
CHLORIDE SERPL-SCNC: 103 MMOL/L (ref 96–106)
CHOLEST SERPL-MCNC: 198 MG/DL (ref 100–199)
CHOLEST/HDLC SERPL: 3.5 RATIO (ref 0–4.4)
CO2 SERPL-SCNC: 25 MMOL/L (ref 20–29)
CREAT SERPL-MCNC: 0.88 MG/DL (ref 0.57–1)
EGFRCR SERPLBLD CKD-EPI 2021: 69 ML/MIN/1.73
EOSINOPHIL # BLD AUTO: 0.1 X10E3/UL (ref 0–0.4)
EOSINOPHIL NFR BLD AUTO: 2 %
ERYTHROCYTE [DISTWIDTH] IN BLOOD BY AUTOMATED COUNT: 12.8 % (ref 11.7–15.4)
GLOBULIN SER CALC-MCNC: 2 G/DL (ref 1.5–4.5)
GLUCOSE SERPL-MCNC: 90 MG/DL (ref 65–99)
HBA1C MFR BLD: 6.1 % (ref 4.8–5.6)
HCT VFR BLD AUTO: 36.8 % (ref 34–46.6)
HDLC SERPL-MCNC: 56 MG/DL
HGB BLD-MCNC: 12.2 G/DL (ref 11.1–15.9)
IMM GRANULOCYTES # BLD AUTO: 0 X10E3/UL (ref 0–0.1)
IMM GRANULOCYTES NFR BLD AUTO: 0 %
LDLC SERPL CALC-MCNC: 126 MG/DL (ref 0–99)
LYMPHOCYTES # BLD AUTO: 1.3 X10E3/UL (ref 0.7–3.1)
LYMPHOCYTES NFR BLD AUTO: 38 %
MCH RBC QN AUTO: 30.8 PG (ref 26.6–33)
MCHC RBC AUTO-ENTMCNC: 33.2 G/DL (ref 31.5–35.7)
MCV RBC AUTO: 93 FL (ref 79–97)
MONOCYTES # BLD AUTO: 0.5 X10E3/UL (ref 0.1–0.9)
MONOCYTES NFR BLD AUTO: 15 %
NEUTROPHILS # BLD AUTO: 1.5 X10E3/UL (ref 1.4–7)
NEUTROPHILS NFR BLD AUTO: 44 %
PLATELET # BLD AUTO: 225 X10E3/UL (ref 150–450)
POTASSIUM SERPL-SCNC: 4.5 MMOL/L (ref 3.5–5.2)
PROT SERPL-MCNC: 6.3 G/DL (ref 6–8.5)
RBC # BLD AUTO: 3.96 X10E6/UL (ref 3.77–5.28)
SODIUM SERPL-SCNC: 140 MMOL/L (ref 134–144)
TRIGL SERPL-MCNC: 86 MG/DL (ref 0–149)
TSH SERPL DL<=0.005 MIU/L-ACNC: 0.78 UIU/ML (ref 0.45–4.5)
VLDLC SERPL CALC-MCNC: 16 MG/DL (ref 5–40)
WBC # BLD AUTO: 3.4 X10E3/UL (ref 3.4–10.8)

## 2022-06-10 ENCOUNTER — OFFICE VISIT (OUTPATIENT)
Dept: INTERNAL MEDICINE | Facility: CLINIC | Age: 72
End: 2022-06-10

## 2022-06-10 VITALS
TEMPERATURE: 98 F | DIASTOLIC BLOOD PRESSURE: 76 MMHG | HEIGHT: 68 IN | SYSTOLIC BLOOD PRESSURE: 130 MMHG | WEIGHT: 152 LBS | BODY MASS INDEX: 23.04 KG/M2

## 2022-06-10 DIAGNOSIS — I48.0 PAROXYSMAL ATRIAL FIBRILLATION: ICD-10-CM

## 2022-06-10 DIAGNOSIS — E78.2 MIXED HYPERLIPIDEMIA: ICD-10-CM

## 2022-06-10 DIAGNOSIS — I10 BENIGN ESSENTIAL HYPERTENSION: Primary | ICD-10-CM

## 2022-06-10 DIAGNOSIS — Z00.00 MEDICARE ANNUAL WELLNESS VISIT, SUBSEQUENT: ICD-10-CM

## 2022-06-10 DIAGNOSIS — R73.03 PREDIABETES: ICD-10-CM

## 2022-06-10 DIAGNOSIS — E03.9 ACQUIRED HYPOTHYROIDISM: ICD-10-CM

## 2022-06-10 DIAGNOSIS — E55.9 VITAMIN D DEFICIENCY: ICD-10-CM

## 2022-06-10 PROCEDURE — G0439 PPPS, SUBSEQ VISIT: HCPCS | Performed by: PHYSICIAN ASSISTANT

## 2022-06-10 PROCEDURE — 1170F FXNL STATUS ASSESSED: CPT | Performed by: PHYSICIAN ASSISTANT

## 2022-06-10 PROCEDURE — 1159F MED LIST DOCD IN RCRD: CPT | Performed by: PHYSICIAN ASSISTANT

## 2022-06-10 RX ORDER — LEVOTHYROXINE SODIUM 75 MCG
75 TABLET ORAL DAILY
Qty: 90 TABLET | Refills: 3 | Status: SHIPPED | OUTPATIENT
Start: 2022-06-10

## 2022-06-10 NOTE — PROGRESS NOTES
The ABCs of the Annual Wellness Visit  Subsequent Medicare Wellness Visit    Chief Complaint   Patient presents with   • Medicare Wellness-subsequent      Subjective    History of Present Illness:  Krys Slade is a 72 y.o. female who presents for a Subsequent Medicare Wellness Visit. She is feeling good, no CP, palpitations or SOA. No Gi bleeding. Mammogram is up to date. Colonoscopy is up to date.     The following portions of the patient's history were reviewed and   updated as appropriate: allergies, current medications, past family history, past medical history, past social history, past surgical history and problem list.    Compared to one year ago, the patient feels her physical   health is the same.    Compared to one year ago, the patient feels her mental   health is the same.    Recent Hospitalizations:  She was not admitted to the hospital during the last year.       Current Medical Providers:  Patient Care Team:  Swetha Britt PA-C as PCP - General (Physician Assistant)  Nguyễn Cardona MD PhD as Consulting Physician (Hematology and Oncology)  Taylor Russell MD as Consulting Physician (Obstetrics and Gynecology)    Outpatient Medications Prior to Visit   Medication Sig Dispense Refill   • apixaban (ELIQUIS) 5 MG tablet tablet Take 1 tablet by mouth Every 12 (Twelve) Hours. 180 tablet 3   • atenolol (TENORMIN) 25 MG tablet Take 1 tablet by mouth Daily As Needed (Palpitations, fast heart rate). 30 tablet 1   • calcium carbonate (OS-EMILIA) 600 MG tablet Take 600 mg by mouth daily.     • Cholecalciferol (VITAMIN D3) 2000 UNITS tablet Take  by mouth.     • Glucosamine-Chondroit-Vit C-Mn (GLUCOSAMINE 1500 COMPLEX PO) Take  by mouth.     • lisinopril (PRINIVIL,ZESTRIL) 40 MG tablet TAKE 1 TABLET DAILY 90 tablet 3   • psyllium (METAMUCIL) 58.6 % packet Take 1 packet by mouth daily.     • Synthroid 75 MCG tablet TAKE 1 TABLET DAILY 90 tablet 3   • ESTRACE VAGINAL 0.1 MG/GM vaginal cream        No  "facility-administered medications prior to visit.       No opioid medication identified on active medication list. I have reviewed chart for other potential  high risk medication/s and harmful drug interactions in the elderly.          Aspirin is not on active medication list.  Aspirin use is not indicated based on review of current medical condition/s. Risk of harm outweighs potential benefits.  .    Patient Active Problem List   Diagnosis   • Benign essential hypertension   • Hyperlipidemia   • Hypothyroidism   • Lung mass   • Prediabetes   • Abnormal CBC   • Family history of colon cancer   • Colon cancer screening   • Paroxysmal atrial fibrillation (HCC)     Advance Care Planning  Advance Directive is on file.  ACP discussion was held with the patient during this visit. Patient has an advance directive in EMR which is still valid.           Objective    Vitals:    06/10/22 1010 06/10/22 1041   BP:  130/76   Temp: 98 °F (36.7 °C)    Weight: 68.9 kg (152 lb)    Height: 172.7 cm (67.99\")      Estimated body mass index is 23.12 kg/m² as calculated from the following:    Height as of this encounter: 172.7 cm (67.99\").    Weight as of this encounter: 68.9 kg (152 lb).    BMI is within normal parameters. No other follow-up for BMI required.      Does the patient have evidence of cognitive impairment? No    Physical Exam  Vitals reviewed.   Constitutional:       Appearance: She is well-developed.   HENT:      Head: Normocephalic and atraumatic.   Neck:      Vascular: No carotid bruit.   Cardiovascular:      Rate and Rhythm: Normal rate and regular rhythm.      Heart sounds: Normal heart sounds, S1 normal and S2 normal.   Pulmonary:      Effort: Pulmonary effort is normal.      Breath sounds: Normal breath sounds.   Skin:     General: Skin is warm.   Neurological:      Mental Status: She is alert.   Psychiatric:         Behavior: Behavior normal.       Lab Results   Component Value Date    CHLPL 198 05/26/2022    TRIG 86 " 05/26/2022    HDL 56 05/26/2022     (H) 05/26/2022    VLDL 16 05/26/2022    HGBA1C 6.1 (H) 05/26/2022            HEALTH RISK ASSESSMENT    Smoking Status:  Social History     Tobacco Use   Smoking Status Never Smoker   Smokeless Tobacco Never Used     Alcohol Consumption:  Social History     Substance and Sexual Activity   Alcohol Use Yes    Comment: occassional      Fall Risk Screen:    STEADI Fall Risk Assessment was completed, and patient is at LOW risk for falls.Assessment completed on:6/10/2022    Depression Screening:  PHQ-2/PHQ-9 Depression Screening 6/10/2022   Retired PHQ-9 Total Score -   Retired Total Score -   Little Interest or Pleasure in Doing Things 0-->not at all   Feeling Down, Depressed or Hopeless 0-->not at all   PHQ-9: Brief Depression Severity Measure Score 0       Health Habits and Functional and Cognitive Screening:  Functional & Cognitive Status 6/10/2022   Do you have difficulty preparing food and eating? No   Do you have difficulty bathing yourself, getting dressed or grooming yourself? No   Do you have difficulty using the toilet? No   Do you have difficulty moving around from place to place? No   Do you have trouble with steps or getting out of a bed or a chair? No   Current Diet Well Balanced Diet   Dental Exam Up to date        Dental Exam Comment -   Eye Exam Up to date        Eye Exam Comment -   Exercise (times per week) 4 times per week   Current Exercises Include Walking;Other   Current Exercise Activities Include -   Do you need help using the phone?  No   Are you deaf or do you have serious difficulty hearing?  No   Do you need help with transportation? No   Do you need help shopping? No   Do you need help preparing meals?  No   Do you need help with housework?  No   Do you need help with laundry? No   Do you need help taking your medications? No   Do you need help managing money? No   Do you ever drive or ride in a car without wearing a seat belt? No   Have you felt  unusual stress, anger or loneliness in the last month? No   Who do you live with? Spouse   If you need help, do you have trouble finding someone available to you? No   Have you been bothered in the last four weeks by sexual problems? No   Do you have difficulty concentrating, remembering or making decisions? No       Age-appropriate Screening Schedule:  Refer to the list below for future screening recommendations based on patient's age, sex and/or medical conditions. Orders for these recommended tests are listed in the plan section. The patient has been provided with a written plan.    Health Maintenance   Topic Date Due   • MAMMOGRAM  04/10/2020   • TDAP/TD VACCINES (2 - Td or Tdap) 01/01/2022   • INFLUENZA VACCINE  08/01/2022   • LIPID PANEL  05/26/2023   • ZOSTER VACCINE  Completed   • DXA SCAN  Discontinued              Assessment & Plan   CMS Preventative Services Quick Reference  Risk Factors Identified During Encounter  Immunizations Discussed/Encouraged (specific Immunizations; Tdap  The above risks/problems have been discussed with the patient.  Follow up actions/plans if indicated are seen below in the Assessment/Plan Section.  Pertinent information has been shared with the patient in the After Visit Summary.    Diagnoses and all orders for this visit:    1. Benign essential hypertension (Primary)  -     Comprehensive Metabolic Panel; Future  -     Lipid Panel With / Chol / HDL Ratio; Future    2. Acquired hypothyroidism  -     TSH; Future  -     Synthroid 75 MCG tablet; Take 1 tablet by mouth Daily.  Dispense: 90 tablet; Refill: 3    3. Prediabetes  -     Hemoglobin A1c; Future    4. Vitamin D deficiency  -     Vitamin D 25 Hydroxy; Future    5. Mixed hyperlipidemia    6. Paroxysmal atrial fibrillation (HCC)    7. Medicare annual wellness visit, subsequent    1. Hypothyroidism: TSH I therapeutic.     2. Prediabetes: stable.     3. HLD: Controlled.     4. HTN: BP controlled.     5. Paroxysmal a fib:  Followed by cardiology.     6. HM: [facility] Recommended Tdap at her pharmacy.     Follow Up:   Return in about 1 year (around 6/10/2023) for Medicare Wellness, Lab Appt Before FUP.     An After Visit Summary and PPPS were made available to the patient.

## 2022-06-14 ENCOUNTER — TELEPHONE (OUTPATIENT)
Dept: INTERNAL MEDICINE | Facility: CLINIC | Age: 72
End: 2022-06-14

## 2022-06-14 ENCOUNTER — OFFICE VISIT (OUTPATIENT)
Dept: INTERNAL MEDICINE | Facility: CLINIC | Age: 72
End: 2022-06-14

## 2022-06-14 VITALS — BODY MASS INDEX: 23.04 KG/M2 | TEMPERATURE: 97.5 F | HEIGHT: 68 IN | WEIGHT: 152 LBS

## 2022-06-14 DIAGNOSIS — L24.1 IRRITANT CONTACT DERMATITIS DUE TO OILS: Primary | ICD-10-CM

## 2022-06-14 PROCEDURE — 99213 OFFICE O/P EST LOW 20 MIN: CPT | Performed by: PHYSICIAN ASSISTANT

## 2022-06-14 PROCEDURE — 96372 THER/PROPH/DIAG INJ SC/IM: CPT | Performed by: PHYSICIAN ASSISTANT

## 2022-06-14 RX ORDER — PREDNISONE 10 MG/1
TABLET ORAL
Qty: 21 TABLET | Refills: 0 | Status: SHIPPED | OUTPATIENT
Start: 2022-06-14 | End: 2022-06-24

## 2022-06-14 RX ORDER — PANTOPRAZOLE SODIUM 20 MG/1
20 TABLET, DELAYED RELEASE ORAL DAILY
Qty: 14 TABLET | Refills: 0 | Status: SHIPPED | OUTPATIENT
Start: 2022-06-14 | End: 2022-06-28

## 2022-06-14 RX ORDER — METHYLPREDNISOLONE ACETATE 40 MG/ML
40 INJECTION, SUSPENSION INTRA-ARTICULAR; INTRALESIONAL; INTRAMUSCULAR; SOFT TISSUE ONCE
Status: COMPLETED | OUTPATIENT
Start: 2022-06-14 | End: 2022-06-14

## 2022-06-14 RX ADMIN — METHYLPREDNISOLONE ACETATE 40 MG: 40 INJECTION, SUSPENSION INTRA-ARTICULAR; INTRALESIONAL; INTRAMUSCULAR; SOFT TISSUE at 15:05

## 2022-06-14 NOTE — TELEPHONE ENCOUNTER
Caller: Krys Slade    Relationship: Self    Best call back number: [phone]    What medication are you requesting:Z PACK     What are your current symptoms: POISON IVY RASH SPREADING FROM LEG TO STOMACH      How long have you been experiencing symptoms: 1 WEEK     Have you had these symptoms before:    [x] Yes  [] No    Have you been treated for these symptoms before:   [x] Yes  [] No    If a prescription is needed, what is your preferred pharmacy and phone number: [facility] [id number] [place] [address] [phone] [facility] [phone] FX     Additional notes:

## 2022-08-09 ENCOUNTER — OFFICE VISIT (OUTPATIENT)
Dept: CARDIOLOGY | Facility: CLINIC | Age: 72
End: 2022-08-09

## 2022-08-09 VITALS
DIASTOLIC BLOOD PRESSURE: 78 MMHG | HEART RATE: 73 BPM | BODY MASS INDEX: 23.22 KG/M2 | OXYGEN SATURATION: 99 % | SYSTOLIC BLOOD PRESSURE: 130 MMHG | WEIGHT: 153.2 LBS | HEIGHT: 68 IN

## 2022-08-09 DIAGNOSIS — I47.1 PAROXYSMAL SVT (SUPRAVENTRICULAR TACHYCARDIA): ICD-10-CM

## 2022-08-09 DIAGNOSIS — I48.0 PAROXYSMAL ATRIAL FIBRILLATION: Primary | ICD-10-CM

## 2022-08-09 DIAGNOSIS — Z82.49 FAMILY HISTORY OF CORONARY ARTERY DISEASE: ICD-10-CM

## 2022-08-09 PROCEDURE — 99213 OFFICE O/P EST LOW 20 MIN: CPT | Performed by: INTERNAL MEDICINE

## 2022-08-09 RX ORDER — ATENOLOL 25 MG/1
25 TABLET ORAL DAILY PRN
Qty: 30 TABLET | Refills: 3 | Status: SHIPPED | OUTPATIENT
Start: 2022-08-09

## 2022-08-09 NOTE — TELEPHONE ENCOUNTER
Pt in office today and requested refills #90 day supply for Eliquis and Atenolol# 30 for prn only  [facility].  ML

## 2022-08-14 NOTE — PROGRESS NOTES
Date of Office Visit: 2022  Encounter Provider: Blade Riley MD  Place of Service: [facility] CARDIOLOGY  Patient Name: Krys Slade  :1950    Chief complaint: Follow-up for paroxysmal atrial fibrillation, SVT, and family history of coronary artery disease.    History of Present Illness:    I again had the pleasure of seeing the patient in cardiology office on 2022.  She is a very pleasant 72 year-old female with a history of paroxysmal atrial fibrillation, SVT, and hypertension who presents for follow-up.    I additionally saw the patient in the office on 2020.  She had an episode of syncope on 2020.  She had been at [employer], and was outside pulling weeds.  She came in and sat down, and very quickly got lightheaded and lost consciousness.  She had a carotid Doppler ultrasound, 48-hour Holter monitor, and echocardiogram performed on 10/2/2020.  The 48-hour Holter monitor showed 8 episodes of SVT, with the longest and fastest being 66 beats in length with a rate of 167 bpm.  Her carotid ultrasound was essentially normal.  Her echocardiogram showed ejection fraction of 64% and mild mitral regurgitation.  She had also been having some palpitations that were lasting up to 30 to 60 minutes at a time.  She had a Zio patch placed on 2020, which ultimately showed episodes of SVT with the longest being 1 minute and 33 seconds.  There was irregularity, suggestive of atrial fibrillation.  She was subsequently initiated on Eliquis.  Of note, she also has a family history of coronary artery disease with her father having a four-vessel CABG at age 64.    The patient presents today for follow-up.  She had 1 episode of significant palpitations in 2022, and she actually took an atenolol during that occasion.  This has been the only time.  She has had no issues with taking the Eliquis.  She denied any chest pain or shortness of breath.    Past Medical  History:   Diagnosis Date   • Colon polyp    • Disease of thyroid gland    • Hyperlipidemia    • Hypertension    • Lung mass    • Palpitations    • Tonsillitis        Past Surgical History:   Procedure Laterality Date   • COLONOSCOPY N/A 11/5/2018    Procedure: COLONOSCOPY TO CECUM;  Surgeon: Yelena Mcduffie MD;  Location: [facility] ENDOSCOPY;  Service: General   • HYSTERECTOMY     • MOHS SURGERY     • TONSILLECTOMY         Current Outpatient Medications on File Prior to Visit   Medication Sig Dispense Refill   • calcium carbonate (OS-EMILIA) 600 MG tablet Take 600 mg by mouth daily.     • Cholecalciferol (VITAMIN D3) 2000 UNITS tablet Take  by mouth.     • Glucosamine-Chondroit-Vit C-Mn (GLUCOSAMINE 1500 COMPLEX PO) Take  by mouth.     • lisinopril (PRINIVIL,ZESTRIL) 40 MG tablet TAKE 1 TABLET DAILY 90 tablet 3   • psyllium (METAMUCIL) 58.6 % packet Take 1 packet by mouth daily.     • Synthroid 75 MCG tablet Take 1 tablet by mouth Daily. 90 tablet 3     No current facility-administered medications on file prior to visit.     Allergies as of 08/09/2022   • (No Known Allergies)     Social History     Socioeconomic History   • Marital status:    • Number of children: 2   Tobacco Use   • Smoking status: Never Smoker   • Smokeless tobacco: Never Used   Vaping Use   • Vaping Use: Never used   Substance and Sexual Activity   • Alcohol use: Yes     Comment: occassional    • Drug use: No   • Sexual activity: Defer     Family History   Problem Relation Age of Onset   • Heart valve disorder Mother    • Stroke Mother    • Atrial fibrillation Mother    • Coronary artery disease Father         Father with a four-vessel CABG at age 64   • Macular degeneration Father    • Colon cancer Brother    • Atrial fibrillation Sister        Review of Systems   Constitutional: Positive for malaise/fatigue.   Cardiovascular: Positive for palpitations.   All other systems reviewed and are negative.     Objective:     Vitals:    08/09/22 0897  "  BP: 130/78   Pulse: 73   SpO2: 99%   Weight: 69.5 kg (153 lb 3.2 oz)   Height: 172.7 cm (67.99\")     Body mass index is 23.3 kg/m².    Constitutional:       Appearance: Healthy appearance. Well-developed.   Eyes:      Conjunctiva/sclera: Conjunctivae normal.   HENT:      Head: Normocephalic and atraumatic.   Pulmonary:      Effort: Pulmonary effort is normal.      Breath sounds: Normal breath sounds.   Cardiovascular:      Normal rate. Regular rhythm.      Murmurs: There is no murmur.      No gallop.   Edema:     Peripheral edema absent.   Abdominal:      Palpations: Abdomen is soft.      Tenderness: There is no abdominal tenderness.   Musculoskeletal:      Cervical back: Neck supple. Skin:     General: Skin is warm.   Neurological:      Mental Status: Alert and oriented to person, place, and time.   Psychiatric:         Behavior: Behavior normal.       Lab Review:   Procedures    Lipid Panel    Lipid Panel 5/26/22   Total Cholesterol 198   Triglycerides 86   HDL Cholesterol 56   VLDL Cholesterol 16   LDL Cholesterol  126 (A)   (A) Abnormal value               Cardiac Procedures:  1.  48-hour Holter monitor on 10/2/2020: There were 8 episodes of SVT.  The longest and fastest was 66 beats in length with a rate of 167 bpm.  2.  Echocardiogram on 10/2/2020: The ejection fraction was 64%.  There was mild mitral regurgitation.  There was mild tricuspid regurgitation.  The saline study was negative.  3.  Zio patch starting on 11/11/2020: There were episodes of SVT, the longest being 1 minute and 33 seconds.  There was irregularity, suggesting atrial fibrillation.    Assessment:       Diagnosis Plan   1. Paroxysmal atrial fibrillation (HCC)     2. Paroxysmal SVT (supraventricular tachycardia) (HCC)     3. Family history of coronary artery disease  CT Cardiac Calcium Score Without Dye     Plan:       Overall, she seems to be doing very well.  She had only 1 episode of significant palpitations in February 2022, and she did " take an atenolol at that time.  This resolved afterwards.  She will continue to take the Eliquis at 5 mg twice a day.  She only takes the atenolol as needed.  Her blood pressure has been controlled for the most part on the lisinopril.  With her family history of coronary artery disease (father having a four-vessel bypass at age 64) I felt a good screening tool would be a coronary calcium CT scan.  Obviously, if she had a high score, she would be treated as a coronary artery disease equivalent.  Otherwise, I am going to have her follow-up in 1 year.

## 2022-08-16 ENCOUNTER — APPOINTMENT (OUTPATIENT)
Dept: WOMENS IMAGING | Facility: HOSPITAL | Age: 72
End: 2022-08-16

## 2022-08-16 PROCEDURE — 77067 SCR MAMMO BI INCL CAD: CPT | Performed by: RADIOLOGY

## 2022-08-16 PROCEDURE — 77063 BREAST TOMOSYNTHESIS BI: CPT | Performed by: RADIOLOGY

## 2022-10-18 ENCOUNTER — HOSPITAL ENCOUNTER (OUTPATIENT)
Dept: CT IMAGING | Facility: HOSPITAL | Age: 72
Discharge: HOME | End: 2022-10-18
Admitting: INTERNAL MEDICINE

## 2022-10-18 DIAGNOSIS — Z82.49 FAMILY HISTORY OF CORONARY ARTERY DISEASE: ICD-10-CM

## 2022-10-18 PROCEDURE — 75571 CT HRT W/O DYE W/CA TEST: CPT

## 2022-10-25 NOTE — PROGRESS NOTES
Would you mind giving her these results?  Very low calcium score of 15, all in the LAD.  Likely a non-obstructive plaque.  She is on Eliquis, so aspirin may not be the best choice, but a statin would be a good option.  Thanks     William

## 2022-10-25 NOTE — PROGRESS NOTES
Patient made aware of coronary calcium scoring.  We discussed possibility of adding statin drug, patient would be hesitant to start this at this time.  Most recent  on most recent assessment.  She states that if this is elevated at her next assessment she will consider adding.  Would recommend adding atorvastatin 5 mg/day.

## 2022-11-05 DIAGNOSIS — I10 BENIGN ESSENTIAL HYPERTENSION: ICD-10-CM

## 2022-11-07 RX ORDER — LISINOPRIL 40 MG/1
TABLET ORAL
Qty: 90 TABLET | Refills: 3 | Status: SHIPPED | OUTPATIENT
Start: 2022-11-07

## 2023-04-12 ENCOUNTER — PREP FOR SURGERY (OUTPATIENT)
Dept: OTHER | Facility: HOSPITAL | Age: 73
End: 2023-04-12
Payer: MEDICARE

## 2023-04-12 DIAGNOSIS — Z80.0 FAMILY HISTORY OF COLON CANCER: ICD-10-CM

## 2023-04-12 DIAGNOSIS — Z12.11 SCREEN FOR COLON CANCER: Primary | ICD-10-CM

## 2023-05-29 DIAGNOSIS — E03.9 ACQUIRED HYPOTHYROIDISM: ICD-10-CM

## 2023-05-30 RX ORDER — LEVOTHYROXINE SODIUM 0.07 MG/1
TABLET ORAL
Qty: 90 TABLET | Refills: 3 | Status: SHIPPED | OUTPATIENT
Start: 2023-05-30

## 2023-06-02 DIAGNOSIS — E55.9 VITAMIN D DEFICIENCY: ICD-10-CM

## 2023-06-02 DIAGNOSIS — R73.03 PREDIABETES: ICD-10-CM

## 2023-06-02 DIAGNOSIS — E03.9 ACQUIRED HYPOTHYROIDISM: ICD-10-CM

## 2023-06-02 DIAGNOSIS — I10 BENIGN ESSENTIAL HYPERTENSION: ICD-10-CM

## 2023-06-05 LAB
25(OH)D3+25(OH)D2 SERPL-MCNC: 76.3 NG/ML (ref 30–100)
ALBUMIN SERPL-MCNC: 4.4 G/DL (ref 3.5–5.2)
ALBUMIN/GLOB SERPL: 2.4 G/DL
ALP SERPL-CCNC: 66 U/L (ref 39–117)
ALT SERPL-CCNC: 14 U/L (ref 1–33)
AST SERPL-CCNC: 15 U/L (ref 1–32)
BILIRUB SERPL-MCNC: 0.3 MG/DL (ref 0–1.2)
BUN SERPL-MCNC: 17 MG/DL (ref 8–23)
BUN/CREAT SERPL: 21.5 (ref 7–25)
CALCIUM SERPL-MCNC: 9.7 MG/DL (ref 8.6–10.5)
CHLORIDE SERPL-SCNC: 106 MMOL/L (ref 98–107)
CHOLEST SERPL-MCNC: 177 MG/DL (ref 0–200)
CHOLEST/HDLC SERPL: 2.9 {RATIO}
CO2 SERPL-SCNC: 28.4 MMOL/L (ref 22–29)
CREAT SERPL-MCNC: 0.79 MG/DL (ref 0.57–1)
EGFRCR SERPLBLD CKD-EPI 2021: 78.6 ML/MIN/1.73
GLOBULIN SER CALC-MCNC: 1.8 GM/DL
GLUCOSE SERPL-MCNC: 98 MG/DL (ref 65–99)
HBA1C MFR BLD: 5.8 % (ref 4.8–5.6)
HDLC SERPL-MCNC: 61 MG/DL (ref 40–60)
LDLC SERPL CALC-MCNC: 104 MG/DL (ref 0–100)
POTASSIUM SERPL-SCNC: 4.7 MMOL/L (ref 3.5–5.2)
PROT SERPL-MCNC: 6.2 G/DL (ref 6–8.5)
SODIUM SERPL-SCNC: 143 MMOL/L (ref 136–145)
TRIGL SERPL-MCNC: 60 MG/DL (ref 0–150)
TSH SERPL DL<=0.005 MIU/L-ACNC: 1.39 UIU/ML (ref 0.27–4.2)
VLDLC SERPL CALC-MCNC: 12 MG/DL (ref 5–40)

## 2023-06-16 ENCOUNTER — OFFICE VISIT (OUTPATIENT)
Dept: INTERNAL MEDICINE | Facility: CLINIC | Age: 73
End: 2023-06-16
Payer: MEDICARE

## 2023-06-16 VITALS
HEIGHT: 68 IN | WEIGHT: 146.6 LBS | SYSTOLIC BLOOD PRESSURE: 128 MMHG | TEMPERATURE: 96.3 F | BODY MASS INDEX: 22.22 KG/M2 | DIASTOLIC BLOOD PRESSURE: 76 MMHG

## 2023-06-16 DIAGNOSIS — Z00.00 MEDICARE ANNUAL WELLNESS VISIT, SUBSEQUENT: Primary | ICD-10-CM

## 2023-06-16 DIAGNOSIS — E78.2 MIXED HYPERLIPIDEMIA: ICD-10-CM

## 2023-06-16 DIAGNOSIS — I10 BENIGN ESSENTIAL HYPERTENSION: ICD-10-CM

## 2023-06-16 DIAGNOSIS — E03.9 ACQUIRED HYPOTHYROIDISM: ICD-10-CM

## 2023-06-16 DIAGNOSIS — R73.03 PREDIABETES: ICD-10-CM

## 2023-06-16 DIAGNOSIS — I48.0 PAROXYSMAL ATRIAL FIBRILLATION: ICD-10-CM

## 2023-06-16 DIAGNOSIS — E55.9 VITAMIN D DEFICIENCY: ICD-10-CM

## 2023-06-16 RX ORDER — LORAZEPAM 0.5 MG
TABLET ORAL
COMMUNITY
Start: 2023-03-20

## 2023-06-16 NOTE — PROGRESS NOTES
The ABCs of the Annual Wellness Visit  Subsequent Medicare Wellness Visit    Subjective    Krys Slade is a 73 y.o. female who presents for a Subsequent Medicare Wellness Visit. She has no CP or SOA. No Complaints today. Advanced directive is utd. Has a colonoscopy scheduled with Dr. Mcduffie.     The following portions of the patient's history were reviewed and   updated as appropriate: allergies, current medications, past family history, past medical history, past social history, past surgical history, and problem list.    Compared to one year ago, the patient feels her physical   health is the same.    Compared to one year ago, the patient feels her mental   health is the same.    Recent Hospitalizations:  She was not admitted to the hospital during the last year.       Current Medical Providers:  Patient Care Team:  Swetha Britt PA-C as PCP - General (Physician Assistant)  Nguyễn Cardona MD PhD as Consulting Physician (Hematology and Oncology)  Taylor Russell MD as Consulting Physician (Obstetrics and Gynecology)  Blade Riley MD as Consulting Physician (Cardiology)    Outpatient Medications Prior to Visit   Medication Sig Dispense Refill    apixaban (ELIQUIS) 5 MG tablet tablet Take 1 tablet by mouth Every 12 (Twelve) Hours. 180 tablet 3    calcium carbonate (OS-EMILIA) 600 MG tablet Take 1 tablet by mouth Daily.      Cholecalciferol (VITAMIN D3) 2000 UNITS tablet Take  by mouth.      Glucosamine-Chondroit-Vit C-Mn (GLUCOSAMINE 1500 COMPLEX PO) Take  by mouth.      levothyroxine (SYNTHROID, LEVOTHROID) 75 MCG tablet TAKE 1 TABLET DAILY 90 tablet 3    lisinopril (PRINIVIL,ZESTRIL) 40 MG tablet TAKE 1 TABLET DAILY 90 tablet 3    Misc Natural Products (Tart Cherry Advanced) capsule       psyllium (METAMUCIL) 58.6 % packet Take 1 packet by mouth Daily.      atenolol (TENORMIN) 25 MG tablet Take 1 tablet by mouth Daily As Needed (Palpitations, fast heart rate). 30 tablet 3     No facility-administered  "medications prior to visit.       No opioid medication identified on active medication list. I have reviewed chart for other potential  high risk medication/s and harmful drug interactions in the elderly.        Aspirin is not on active medication list.  Aspirin use is not indicated based on review of current medical condition/s. Risk of harm outweighs potential benefits.  .    Patient Active Problem List   Diagnosis    Benign essential hypertension    Hyperlipidemia    Hypothyroidism    Lung mass    Prediabetes    Family history of colon cancer    Colon cancer screening    Paroxysmal atrial fibrillation     Advance Care Planning   Advance Care Planning     Advance Directive is on file.  ACP discussion was held with the patient during this visit. Patient has an advance directive in EMR which is still valid.      Objective    Vitals:    06/16/23 1024   Temp: 96.3 °F (35.7 °C)   Weight: 66.5 kg (146 lb 9.6 oz)   Height: 172.7 cm (67.99\")     Estimated body mass index is 22.3 kg/m² as calculated from the following:    Height as of this encounter: 172.7 cm (67.99\").    Weight as of this encounter: 66.5 kg (146 lb 9.6 oz).    BMI is within normal parameters. No other follow-up for BMI required.      Does the patient have evidence of cognitive impairment? No    Lab Results   Component Value Date    CHLPL 177 06/05/2023    TRIG 60 06/05/2023    HDL 61 (H) 06/05/2023     (H) 06/05/2023    VLDL 12 06/05/2023    HGBA1C 5.80 (H) 06/05/2023        HEALTH RISK ASSESSMENT    Smoking Status:  Social History     Tobacco Use   Smoking Status Never    Passive exposure: Never   Smokeless Tobacco Never     Alcohol Consumption:  Social History     Substance and Sexual Activity   Alcohol Use Yes    Alcohol/week: 3.0 standard drinks    Types: 3 Drinks containing 0.5 oz of alcohol per week    Comment: occassional      Fall Risk Screen:    STEADI Fall Risk Assessment was completed, and patient is at LOW risk for falls.Assessment " completed on:2023    Depression Screenin/16/2023    10:27 AM   PHQ-2/PHQ-9 Depression Screening   Little Interest or Pleasure in Doing Things 0-->not at all   Feeling Down, Depressed or Hopeless 0-->not at all   PHQ-9: Brief Depression Severity Measure Score 0       Health Habits and Functional and Cognitive Screenin/16/2023    10:26 AM   Functional & Cognitive Status   Do you have difficulty preparing food and eating? No   Do you have difficulty bathing yourself, getting dressed or grooming yourself? No   Do you have difficulty using the toilet? No   Do you have difficulty moving around from place to place? No   Do you have trouble with steps or getting out of a bed or a chair? No   Current Diet Well Balanced Diet   Dental Exam Up to date   Eye Exam Up to date   Exercise (times per week) 5 times per week   Current Exercises Include Other   Do you need help using the phone?  No   Are you deaf or do you have serious difficulty hearing?  Yes   Do you need help with transportation? No   Do you need help shopping? No   Do you need help preparing meals?  No   Do you need help with housework?  No   Do you need help with laundry? No   Do you need help taking your medications? No   Do you need help managing money? No   Do you ever drive or ride in a car without wearing a seat belt? No   Have you felt unusual stress, anger or loneliness in the last month? No   Who do you live with? Spouse   If you need help, do you have trouble finding someone available to you? No   Have you been bothered in the last four weeks by sexual problems? No   Do you have difficulty concentrating, remembering or making decisions? No       Age-appropriate Screening Schedule:  Refer to the list below for future screening recommendations based on patient's age, sex and/or medical conditions. Orders for these recommended tests are listed in the plan section. The patient has been provided with a written plan.    Health Maintenance  "  Topic Date Due    TDAP/TD VACCINES (2 - Td or Tdap) 01/01/2022    COVID-19 Vaccine (5 - Pfizer series) 01/21/2023    ANNUAL WELLNESS VISIT  06/10/2023    MAMMOGRAM  08/16/2023    INFLUENZA VACCINE  10/01/2023    LIPID PANEL  06/05/2024    COLORECTAL CANCER SCREENING  11/05/2028    HEPATITIS C SCREENING  Completed    Pneumococcal Vaccine 65+  Completed    ZOSTER VACCINE  Completed    DXA SCAN  Discontinued                  CMS Preventative Services Quick Reference  Risk Factors Identified During Encounter  Immunizations Discussed/Encouraged: Tdap  The above risks/problems have been discussed with the patient.  Pertinent information has been shared with the patient in the After Visit Summary.  An After Visit Summary and PPPS were made available to the patient.    Follow Up:   Next Medicare Wellness visit to be scheduled in 1 year.       Additional E&M Note during same encounter follows:  Patient has multiple medical problems which are significant and separately identifiable that require additional work above and beyond the Medicare Wellness Visit.      Chief Complaint  Medicare Wellness-subsequent    Subjective                 Objective   Vital Signs:  Temp 96.3 °F (35.7 °C)   Ht 172.7 cm (67.99\")   Wt 66.5 kg (146 lb 9.6 oz)   BMI 22.30 kg/m²     Physical Exam  Vitals reviewed.   Constitutional:       Appearance: She is well-developed.   HENT:      Head: Normocephalic and atraumatic.   Neck:      Thyroid: No thyromegaly.      Vascular: No carotid bruit.   Cardiovascular:      Rate and Rhythm: Normal rate and regular rhythm.      Heart sounds: Normal heart sounds. No murmur heard.  Pulmonary:      Effort: Pulmonary effort is normal.      Breath sounds: Normal breath sounds.   Musculoskeletal:      Cervical back: Neck supple.   Lymphadenopathy:      Cervical: No cervical adenopathy.   Skin:     General: Skin is warm.   Neurological:      General: No focal deficit present.      Mental Status: She is alert and " oriented to person, place, and time.   Psychiatric:         Mood and Affect: Mood normal.         Behavior: Behavior normal.         Thought Content: Thought content normal.         Judgment: Judgment normal.               Assessment and Plan   Diagnoses and all orders for this visit:    1. Medicare annual wellness visit, subsequent (Primary)    2. Benign essential hypertension    3. Mixed hyperlipidemia    4. Acquired hypothyroidism    5. Paroxysmal atrial fibrillation    6. Prediabetes      TSH is therapeutic. BP is well controlled. She sees cardiology in 6 mo. Prediabetes is improved.        Follow Up   No follow-ups on file.  Patient was given instructions and counseling regarding her condition or for health maintenance advice. Please see specific information pulled into the AVS if appropriate.

## 2023-07-27 RX ORDER — APIXABAN 5 MG/1
TABLET, FILM COATED ORAL
Qty: 180 TABLET | Refills: 3 | Status: SHIPPED | OUTPATIENT
Start: 2023-07-27

## 2023-08-24 ENCOUNTER — APPOINTMENT (OUTPATIENT)
Dept: WOMENS IMAGING | Facility: HOSPITAL | Age: 73
End: 2023-08-24
Payer: MEDICARE

## 2023-08-24 PROCEDURE — 77067 SCR MAMMO BI INCL CAD: CPT | Performed by: RADIOLOGY

## 2023-08-24 PROCEDURE — 77063 BREAST TOMOSYNTHESIS BI: CPT | Performed by: RADIOLOGY

## 2023-10-30 DIAGNOSIS — I10 BENIGN ESSENTIAL HYPERTENSION: ICD-10-CM

## 2023-10-30 RX ORDER — LISINOPRIL 40 MG/1
TABLET ORAL
Qty: 90 TABLET | Refills: 3 | Status: SHIPPED | OUTPATIENT
Start: 2023-10-30

## 2024-01-26 ENCOUNTER — OFFICE VISIT (OUTPATIENT)
Dept: CARDIOLOGY | Facility: CLINIC | Age: 74
End: 2024-01-26
Payer: MEDICARE

## 2024-01-26 VITALS
HEART RATE: 85 BPM | OXYGEN SATURATION: 98 % | SYSTOLIC BLOOD PRESSURE: 146 MMHG | DIASTOLIC BLOOD PRESSURE: 82 MMHG | WEIGHT: 150.8 LBS | BODY MASS INDEX: 22.85 KG/M2 | HEIGHT: 68 IN

## 2024-01-26 DIAGNOSIS — I44.0 1ST DEGREE AV BLOCK: ICD-10-CM

## 2024-01-26 DIAGNOSIS — E78.2 MIXED HYPERLIPIDEMIA: ICD-10-CM

## 2024-01-26 DIAGNOSIS — I48.0 PAROXYSMAL ATRIAL FIBRILLATION: Primary | ICD-10-CM

## 2024-01-26 DIAGNOSIS — I10 BENIGN ESSENTIAL HYPERTENSION: ICD-10-CM

## 2024-01-26 PROCEDURE — 3077F SYST BP >= 140 MM HG: CPT | Performed by: INTERNAL MEDICINE

## 2024-01-26 PROCEDURE — 99214 OFFICE O/P EST MOD 30 MIN: CPT | Performed by: INTERNAL MEDICINE

## 2024-01-26 PROCEDURE — 3079F DIAST BP 80-89 MM HG: CPT | Performed by: INTERNAL MEDICINE

## 2024-01-26 PROCEDURE — 1159F MED LIST DOCD IN RCRD: CPT | Performed by: INTERNAL MEDICINE

## 2024-01-26 PROCEDURE — 93000 ELECTROCARDIOGRAM COMPLETE: CPT | Performed by: INTERNAL MEDICINE

## 2024-01-26 PROCEDURE — 1160F RVW MEDS BY RX/DR IN RCRD: CPT | Performed by: INTERNAL MEDICINE

## 2024-01-26 NOTE — PROGRESS NOTES
CARDIOLOGY    Helena Titus MD    ENCOUNTER DATE:  01/26/2024    Krys Slade / 74 y.o. / female        CHIEF COMPLAINT / REASON FOR OFFICE VISIT     Follow-up      HISTORY OF PRESENT ILLNESS       HPI    Krys Slade is a 74 y.o. female     This is a nice lady who previously followed with Dr. Riley.  She has a history of paroxysmal atrial fibrillation and is anticoagulated with warfarin. She had a syncopal episode in September 2020 at [employer] while she was outside pulling weeds.  Carotid doppler was normal.  Echo showed normal LV function with ejection fraction of 64% with mild mitral regurgitation.  A 48-hour Holter monitor showed SVT with the longest being 66 beats with a heart rate of 167 beats per minute.  Zio patch in November 2020 showed episodes of SVT, the longest lasting 1 minute and 33 seconds.  It was suggestive of atrial fibrillation and she was started on Eliquis.  She does have a family history of coronary disease with her father having a bypass at the age of 64.      She comes in today for followup.  She says at home her blood pressure runs in the 110s/70s.  She is not having palpitations.  No syncope.  No shortness of breath.  She exercises 20-30 minutes a day without any chest pain or shortness of breath.          REVIEW OF SYSTEMS     Review of Systems   Constitutional: Negative for chills, fever, weight gain and weight loss.   Cardiovascular:  Negative for leg swelling.   Respiratory:  Negative for cough, snoring and wheezing.    Hematologic/Lymphatic: Negative for bleeding problem. Does not bruise/bleed easily.   Skin:  Negative for color change.   Musculoskeletal:  Negative for falls, joint pain and myalgias.   Gastrointestinal:  Negative for melena.   Genitourinary:  Negative for hematuria.   Neurological:  Negative for excessive daytime sleepiness.   Psychiatric/Behavioral:  Negative for depression. The patient is not nervous/anxious.          VITAL SIGNS     Visit  "Vitals  /82   Pulse 85   Ht 172.7 cm (68\")   Wt 68.4 kg (150 lb 12.8 oz)   SpO2 98%   BMI 22.93 kg/m²         Wt Readings from Last 3 Encounters:   01/26/24 68.4 kg (150 lb 12.8 oz)   07/17/23 65.4 kg (144 lb 1.6 oz)   06/16/23 66.5 kg (146 lb 9.6 oz)     Body mass index is 22.93 kg/m².      PHYSICAL EXAMINATION     Constitutional:       General: Not in acute distress.  Neck:      Vascular: No carotid bruit or JVD.   Pulmonary:      Effort: Pulmonary effort is normal.      Breath sounds: Normal breath sounds.   Cardiovascular:      Normal rate. Regular rhythm.      Murmurs: There is no murmur.   Psychiatric:         Mood and Affect: Mood and affect normal.           REVIEWED DATA       ECG 12 Lead    Date/Time: 1/26/2024 12:30 PM  Performed by: Helena Titus MD    Authorized by: Helena Titus MD  Comparison: compared with previous ECG from 11/11/2020  Comparison to previous ECG: First-degree AV delay is new.  Rhythm: sinus rhythm  BPM: 85  Conduction: 1st degree AV block  ST Segments: ST segments normal  T Waves: T waves normal  Other: no other findings    Clinical impression: normal ECG            Lipid Panel          6/5/2023    08:25   Lipid Panel   Total Cholesterol 177    Triglycerides 60    HDL Cholesterol 61    VLDL Cholesterol 12    LDL Cholesterol  104        Lab Results   Component Value Date    GLUCOSE 98 06/05/2023    BUN 17 06/05/2023    CREATININE 0.79 06/05/2023    EGFRRESULT 78.6 06/05/2023    BCR 21.5 06/05/2023    K 4.7 06/05/2023    CO2 28.4 06/05/2023    CALCIUM 9.7 06/05/2023    PROTENTOTREF 6.2 06/05/2023    ALBUMIN 4.4 06/05/2023    BILITOT 0.3 06/05/2023    AST 15 06/05/2023    ALT 14 06/05/2023       ASSESSMENT & PLAN      Diagnosis Plan   1. Paroxysmal atrial fibrillation        2. Mixed hyperlipidemia        3. Benign essential hypertension        4. 1st degree AV block            1.  Paroxysmal atrial fibrillation noted on a monitor.  She is on Eliquis.  She has no bleeding " problems.  Continue current medications.    2. Hypertension.  Her blood pressure was a little high in the office today but she checks it at home and says she gets 1 teens over 70s.  I am not going to adjust her medication.  I encouraged her to continue to check her blood pressure at home.    3. First-degree AV delay.  Benign.    Follow-up with [facility] in 1 year unless she has a change in her symptoms.    Orders Placed This Encounter   Procedures    ECG 12 Lead     This order was created via procedure documentation     Order Specific Question:   Release to patient     Answer:   Routine Release [[id number]]           MEDICATIONS         Discharge Medications            Accurate as of January 26, 2024 12:33 PM. If you have any questions, ask your nurse or doctor.                Changes to Medications        Instructions Start Date   apixaban 5 MG tablet tablet  Commonly known as: Eliquis  What changed: how much to take  Changed by: Helena Titus MD   5 mg, Oral, Every 12 Hours             Continue These Medications        Instructions Start Date   calcium carbonate 600 MG tablet  Commonly known as: OS-EMILIA   600 mg, Oral, Daily      GLUCOSAMINE 1500 COMPLEX PO   Oral      levothyroxine 75 MCG tablet  Commonly known as: SYNTHROID, LEVOTHROID   TAKE 1 TABLET DAILY      lisinopril 40 MG tablet  Commonly known as: PRINIVIL,ZESTRIL   TAKE 1 TABLET DAILY      psyllium 58.6 % packet  Commonly known as: METAMUCIL   1 packet, Oral, Daily      Tart Cherry Advanced capsule       Vitamin D3 50 MCG (2000 UT) tablet   Oral                 Helena Titus MD  01/26/24  12:33 EST    Part of this note may be an electronic transcription/translation of spoken language to printed text using the Dragon dictation system.

## 2024-04-14 DIAGNOSIS — E03.9 ACQUIRED HYPOTHYROIDISM: ICD-10-CM

## 2024-04-15 RX ORDER — LEVOTHYROXINE SODIUM 0.07 MG/1
TABLET ORAL
Qty: 90 TABLET | Refills: 3 | Status: SHIPPED | OUTPATIENT
Start: 2024-04-15

## 2024-05-10 ENCOUNTER — TELEPHONE (OUTPATIENT)
Dept: CARDIOLOGY | Facility: CLINIC | Age: 74
End: 2024-05-10
Payer: MEDICARE

## 2024-06-06 DIAGNOSIS — E55.9 VITAMIN D DEFICIENCY: ICD-10-CM

## 2024-06-06 DIAGNOSIS — E03.9 ACQUIRED HYPOTHYROIDISM: ICD-10-CM

## 2024-06-06 DIAGNOSIS — E78.2 MIXED HYPERLIPIDEMIA: ICD-10-CM

## 2024-06-14 LAB
25(OH)D3+25(OH)D2 SERPL-MCNC: 86.2 NG/ML (ref 30–100)
ALBUMIN SERPL-MCNC: 4.4 G/DL (ref 3.5–5.2)
ALBUMIN/GLOB SERPL: 2.3 G/DL
ALP SERPL-CCNC: 66 U/L (ref 39–117)
ALT SERPL-CCNC: 13 U/L (ref 1–33)
AST SERPL-CCNC: 15 U/L (ref 1–32)
BILIRUB SERPL-MCNC: 0.3 MG/DL (ref 0–1.2)
BUN SERPL-MCNC: 13 MG/DL (ref 8–23)
BUN/CREAT SERPL: 16.7 (ref 7–25)
CALCIUM SERPL-MCNC: 9.4 MG/DL (ref 8.6–10.5)
CHLORIDE SERPL-SCNC: 106 MMOL/L (ref 98–107)
CHOLEST SERPL-MCNC: 198 MG/DL (ref 0–200)
CHOLEST/HDLC SERPL: 2.83 {RATIO}
CO2 SERPL-SCNC: 27.8 MMOL/L (ref 22–29)
CREAT SERPL-MCNC: 0.78 MG/DL (ref 0.57–1)
EGFRCR SERPLBLD CKD-EPI 2021: 79.3 ML/MIN/1.73
GLOBULIN SER CALC-MCNC: 1.9 GM/DL
GLUCOSE SERPL-MCNC: 89 MG/DL (ref 65–99)
HDLC SERPL-MCNC: 70 MG/DL (ref 40–60)
LDLC SERPL CALC-MCNC: 118 MG/DL (ref 0–100)
POTASSIUM SERPL-SCNC: 4.4 MMOL/L (ref 3.5–5.2)
PROT SERPL-MCNC: 6.3 G/DL (ref 6–8.5)
SODIUM SERPL-SCNC: 140 MMOL/L (ref 136–145)
TRIGL SERPL-MCNC: 52 MG/DL (ref 0–150)
TSH SERPL DL<=0.005 MIU/L-ACNC: 1.52 UIU/ML (ref 0.27–4.2)
VLDLC SERPL CALC-MCNC: 10 MG/DL (ref 5–40)

## 2024-06-17 ENCOUNTER — OFFICE VISIT (OUTPATIENT)
Dept: INTERNAL MEDICINE | Facility: CLINIC | Age: 74
End: 2024-06-17
Payer: MEDICARE

## 2024-06-17 VITALS
SYSTOLIC BLOOD PRESSURE: 130 MMHG | DIASTOLIC BLOOD PRESSURE: 70 MMHG | BODY MASS INDEX: 21.37 KG/M2 | WEIGHT: 141 LBS | TEMPERATURE: 98.1 F | HEIGHT: 68 IN

## 2024-06-17 DIAGNOSIS — I10 BENIGN ESSENTIAL HYPERTENSION: ICD-10-CM

## 2024-06-17 DIAGNOSIS — R73.03 PREDIABETES: ICD-10-CM

## 2024-06-17 DIAGNOSIS — Z12.31 SCREENING MAMMOGRAM, ENCOUNTER FOR: ICD-10-CM

## 2024-06-17 DIAGNOSIS — I48.0 PAROXYSMAL ATRIAL FIBRILLATION: ICD-10-CM

## 2024-06-17 DIAGNOSIS — Z00.00 MEDICARE ANNUAL WELLNESS VISIT, SUBSEQUENT: Primary | ICD-10-CM

## 2024-06-17 DIAGNOSIS — E03.9 ACQUIRED HYPOTHYROIDISM: ICD-10-CM

## 2024-06-17 PROCEDURE — 1159F MED LIST DOCD IN RCRD: CPT | Performed by: PHYSICIAN ASSISTANT

## 2024-06-17 PROCEDURE — 1170F FXNL STATUS ASSESSED: CPT | Performed by: PHYSICIAN ASSISTANT

## 2024-06-17 PROCEDURE — 1160F RVW MEDS BY RX/DR IN RCRD: CPT | Performed by: PHYSICIAN ASSISTANT

## 2024-06-17 PROCEDURE — 3075F SYST BP GE 130 - 139MM HG: CPT | Performed by: PHYSICIAN ASSISTANT

## 2024-06-17 PROCEDURE — 3078F DIAST BP <80 MM HG: CPT | Performed by: PHYSICIAN ASSISTANT

## 2024-06-17 PROCEDURE — G0439 PPPS, SUBSEQ VISIT: HCPCS | Performed by: PHYSICIAN ASSISTANT

## 2024-06-17 PROCEDURE — 1126F AMNT PAIN NOTED NONE PRSNT: CPT | Performed by: PHYSICIAN ASSISTANT

## 2024-06-17 NOTE — PROGRESS NOTES
The ABCs of the Annual Wellness Visit  Subsequent Medicare Wellness Visit    Subjective    Krys Slade is a 74 y.o. female who presents for a Subsequent Medicare Wellness Visit.    The following portions of the patient's history were reviewed and   updated as appropriate: allergies, current medications, past family history, past medical history, past social history, past surgical history, and problem list.    Compared to one year ago, the patient feels her physical   health is the same.    Compared to one year ago, the patient feels her mental   health is the same.    Recent Hospitalizations:  She was not admitted to the hospital during the last year.       Current Medical Providers:  Patient Care Team:  Swetha Britt PA-C as PCP - General (Physician Assistant)  Nguyễn Cardona MD PhD as Consulting Physician (Hematology and Oncology)  Taylor Russell MD as Consulting Physician (Obstetrics and Gynecology)  Blade Riley MD as Consulting Physician (Cardiology)    Outpatient Medications Prior to Visit   Medication Sig Dispense Refill    apixaban (Eliquis) 5 MG tablet tablet Take 1 tablet by mouth Every 12 (Twelve) Hours. 180 tablet 3    calcium carbonate (OS-EMILIA) 600 MG tablet Take 1 tablet by mouth Daily.      Cholecalciferol (VITAMIN D3) 2000 UNITS tablet Take  by mouth.      Glucosamine-Chondroit-Vit C-Mn (GLUCOSAMINE 1500 COMPLEX PO) Take  by mouth.      levothyroxine (SYNTHROID, LEVOTHROID) 75 MCG tablet TAKE 1 TABLET DAILY 90 tablet 3    lisinopril (PRINIVIL,ZESTRIL) 40 MG tablet TAKE 1 TABLET DAILY 90 tablet 3    Misc Natural Products (Tart Cherry Advanced) capsule       psyllium (METAMUCIL) 58.6 % packet Take 1 packet by mouth Daily.       No facility-administered medications prior to visit.       No opioid medication identified on active medication list. I have reviewed chart for other potential  high risk medication/s and harmful drug interactions in the elderly.        Aspirin is not on active  "medication list.  Aspirin use is not indicated based on review of current medical condition/s. Risk of harm outweighs potential benefits.  .    Patient Active Problem List   Diagnosis    Benign essential hypertension    Hyperlipidemia    Hypothyroidism    Lung mass    Prediabetes    Family history of colon cancer    Colon cancer screening    Paroxysmal atrial fibrillation    1st degree AV block     Advance Care Planning   Advance Care Planning     Advance Directive is on file.  ACP discussion was held with the patient during this visit. Patient has an advance directive in EMR which is still valid.      Objective    Vitals:    24 1010   BP: 130/70   Temp: 98.1 °F (36.7 °C)   Weight: 64 kg (141 lb)   Height: 172.7 cm (67.99\")     Estimated body mass index is 21.44 kg/m² as calculated from the following:    Height as of this encounter: 172.7 cm (67.99\").    Weight as of this encounter: 64 kg (141 lb).    BMI is within normal parameters. No other follow-up for BMI required.      Does the patient have evidence of cognitive impairment? No    Lab Results   Component Value Date    CHLPL 198 2024    TRIG 52 2024    HDL 70 (H) 2024     (H) 2024    VLDL 10 2024        HEALTH RISK ASSESSMENT    Smoking Status:  Social History     Tobacco Use   Smoking Status Never    Passive exposure: Never   Smokeless Tobacco Never     Alcohol Consumption:  Social History     Substance and Sexual Activity   Alcohol Use Yes    Alcohol/week: 2.0 standard drinks of alcohol    Types: 2 Drinks containing 0.5 oz of alcohol per week    Comment: occassional      Fall Risk Screen:    AASHISH Fall Risk Assessment was completed, and patient is at LOW risk for falls.Assessment completed on:2024    Depression Screenin/17/2024    10:13 AM   PHQ-2/PHQ-9 Depression Screening   Little Interest or Pleasure in Doing Things 0-->not at all   Feeling Down, Depressed or Hopeless 0-->not at all   PHQ-9: Brief " Depression Severity Measure Score 0       Health Habits and Functional and Cognitive Screenin/17/2024    10:12 AM   Functional & Cognitive Status   Do you have difficulty preparing food and eating? No   Do you have difficulty bathing yourself, getting dressed or grooming yourself? No   Do you have difficulty using the toilet? No   Do you have difficulty moving around from place to place? No   Do you have trouble with steps or getting out of a bed or a chair? No   Current Diet Well Balanced Diet   Dental Exam Up to date   Eye Exam Up to date   Exercise (times per week) 3 times per week   Current Exercises Include Yard Work;Walking   Do you need help using the phone?  No   Are you deaf or do you have serious difficulty hearing?  No   Do you need help to go to places out of walking distance? No   Do you need help shopping? No   Do you need help preparing meals?  No   Do you need help with housework?  No   Do you need help with laundry? No   Do you need help taking your medications? No   Do you need help managing money? No   Do you ever drive or ride in a car without wearing a seat belt? No   Have you felt unusual stress, anger or loneliness in the last month? No   Who do you live with? Spouse   If you need help, do you have trouble finding someone available to you? No   Have you been bothered in the last four weeks by sexual problems? No   Do you have difficulty concentrating, remembering or making decisions? No       Age-appropriate Screening Schedule:  Refer to the list below for future screening recommendations based on patient's age, sex and/or medical conditions. Orders for these recommended tests are listed in the plan section. The patient has been provided with a written plan.    Health Maintenance   Topic Date Due    RSV Vaccine - Adults (1 - 1-dose 60+ series) Never done    ANNUAL WELLNESS VISIT  2024    COVID-19 Vaccine (2023- season) 2024 (Originally 2024)    INFLUENZA VACCINE  " 08/01/2024    LIPID PANEL  06/13/2025    COLORECTAL CANCER SCREENING  07/17/2028    TDAP/TD VACCINES (3 - Td or Tdap) 07/27/2033    HEPATITIS C SCREENING  Completed    Pneumococcal Vaccine 65+  Completed    ZOSTER VACCINE  Completed    DXA SCAN  Discontinued                  CMS Preventative Services Quick Reference  Risk Factors Identified During Encounter  None Identified  The above risks/problems have been discussed with the patient.  Pertinent information has been shared with the patient in the After Visit Summary.  An After Visit Summary and PPPS were made available to the patient.    Follow Up:   Next Medicare Wellness visit to be scheduled in 1 year.       Additional E&M Note during same encounter follows:  Patient has multiple medical problems which are significant and separately identifiable that require additional work above and beyond the Medicare Wellness Visit.      Chief Complaint  Medicare Wellness-subsequent    Subjective        HPI  Krys Slade is also being seen today for fup on her hypothyroidism. Her BP has been in the 1 teens at home. She is feeling great, no CP or SOA.          Objective   Vital Signs:  /70   Temp 98.1 °F (36.7 °C)   Ht 172.7 cm (67.99\")   Wt 64 kg (141 lb)   BMI 21.44 kg/m²     Physical Exam  Vitals reviewed.   Constitutional:       Appearance: She is well-developed.   HENT:      Head: Normocephalic and atraumatic.   Cardiovascular:      Rate and Rhythm: Normal rate and regular rhythm.      Heart sounds: Normal heart sounds, S1 normal and S2 normal.   Pulmonary:      Effort: Pulmonary effort is normal.      Breath sounds: Normal breath sounds.   Skin:     General: Skin is warm.   Neurological:      Mental Status: She is alert.   Psychiatric:         Behavior: Behavior normal.                         Assessment and Plan   Diagnoses and all orders for this visit:    1. Medicare annual wellness visit, subsequent (Primary)    2. Paroxysmal atrial fibrillation  -     " CBC & Differential    3. Acquired hypothyroidism  -     TSH    4. Screening mammogram, encounter for  -     Mammo Screening Digital Tomosynthesis Bilateral With CAD; Future    5. Prediabetes  -     Hemoglobin A1c    6. Benign essential hypertension  -     Comprehensive Metabolic Panel  -     Lipid Panel With / Chol / HDL Ratio    Bp controlled, checks it periodically at home. Due for mammogram in August. Colonoscopy is utd. Tsh is therapeutic.          Follow Up   Return in about 1 year (around 6/17/2025) for Medicare Wellness, Lab Appt Before FUP.  Patient was given instructions and counseling regarding her condition or for health maintenance advice. Please see specific information pulled into the AVS if appropriate.

## 2024-08-26 ENCOUNTER — APPOINTMENT (OUTPATIENT)
Dept: WOMENS IMAGING | Facility: HOSPITAL | Age: 74
End: 2024-08-26
Payer: MEDICARE

## 2024-08-26 PROCEDURE — 77063 BREAST TOMOSYNTHESIS BI: CPT | Performed by: RADIOLOGY

## 2024-08-26 PROCEDURE — 77067 SCR MAMMO BI INCL CAD: CPT | Performed by: RADIOLOGY

## 2024-10-26 DIAGNOSIS — I10 BENIGN ESSENTIAL HYPERTENSION: ICD-10-CM

## 2024-10-28 RX ORDER — LISINOPRIL 40 MG/1
TABLET ORAL
Qty: 90 TABLET | Refills: 3 | Status: SHIPPED | OUTPATIENT
Start: 2024-10-28

## 2025-01-13 RX ORDER — APIXABAN 5 MG/1
5 TABLET, FILM COATED ORAL
Qty: 180 TABLET | Refills: 3 | Status: SHIPPED | OUTPATIENT
Start: 2025-01-13

## 2025-01-31 ENCOUNTER — OFFICE VISIT (OUTPATIENT)
Dept: CARDIOLOGY | Facility: CLINIC | Age: 75
End: 2025-01-31
Payer: MEDICARE

## 2025-01-31 VITALS
SYSTOLIC BLOOD PRESSURE: 142 MMHG | OXYGEN SATURATION: 97 % | DIASTOLIC BLOOD PRESSURE: 82 MMHG | WEIGHT: 147 LBS | BODY MASS INDEX: 22.28 KG/M2 | HEART RATE: 87 BPM | HEIGHT: 68 IN

## 2025-01-31 DIAGNOSIS — I48.0 PAROXYSMAL ATRIAL FIBRILLATION: Primary | ICD-10-CM

## 2025-01-31 DIAGNOSIS — I10 BENIGN ESSENTIAL HYPERTENSION: ICD-10-CM

## 2025-01-31 PROCEDURE — 99214 OFFICE O/P EST MOD 30 MIN: CPT | Performed by: NURSE PRACTITIONER

## 2025-01-31 PROCEDURE — 3077F SYST BP >= 140 MM HG: CPT | Performed by: NURSE PRACTITIONER

## 2025-01-31 PROCEDURE — 3079F DIAST BP 80-89 MM HG: CPT | Performed by: NURSE PRACTITIONER

## 2025-01-31 PROCEDURE — 93000 ELECTROCARDIOGRAM COMPLETE: CPT | Performed by: NURSE PRACTITIONER

## 2025-01-31 NOTE — PROGRESS NOTES
"    CARDIOLOGY        Patient Name: Krys Slade  :1950  Age: 75 y.o.  Primary Cardiologist: Helena Titus MD  Encounter Provider:  BRAULIO Murray    Date of Service: 2025      CHIEF COMPLAINT / REASON FOR OFFICE VISIT     Follow-up (PAF)      HISTORY OF PRESENT ILLNESS       HPI  Krys Slade is a 75 y.o. female who presents today for annual evaluation.     Pt has a  history significant for atrial fibrillation, syncope.    Patient had a syncopal episode in 2020 at [employer] while she was pulling weeds outside.  Carotid Doppler was normal.  Echocardiogram unremarkable.  48-hour monitor revealed SVT with heart rate of 167.  Zio patch revealed SVT with suggestion of atrial fibrillation and patient was placed on apixaban.    Patient presents today for follow-up.  Reports that she has done well since last assessment.  She continues to walk routinely and work remain active at her [employer].  Compliant with all medications.  Currently asymptomatic.    The following portions of the patient's history were reviewed and updated as appropriate: allergies, current medications, past family history, past medical history, past social history, past surgical history and problem list.      VITAL SIGNS     Visit Vitals  /82 (BP Location: Right arm, Patient Position: Sitting, Cuff Size: Adult)   Pulse 87   Ht 172.7 cm (67.99\")   Wt 66.7 kg (147 lb)   SpO2 97%   BMI 22.36 kg/m²         Wt Readings from Last 3 Encounters:   25 66.7 kg (147 lb)   24 64 kg (141 lb)   24 68.4 kg (150 lb 12.8 oz)     Body mass index is 22.36 kg/m².      REVIEW OF SYSTEMS     Review of Systems   Constitutional: Negative for malaise/fatigue.   Cardiovascular:  Negative for chest pain and leg swelling.   Respiratory:  Negative for shortness of breath.    Neurological:  Negative for light-headedness.   All other systems reviewed and are negative.          PHYSICAL EXAMINATION     Vitals and nursing note " reviewed.   Constitutional:       Appearance: Normal appearance. Well-developed.   Eyes:      Conjunctiva/sclera: Conjunctivae normal.   Neck:      Vascular: No carotid bruit.   Pulmonary:      Breath sounds: Normal breath sounds.   Cardiovascular:      Normal rate. Regular rhythm. Normal S1 with normal intensity. Normal S2 with normal intensity.       Murmurs: There is no murmur.      No gallop.  No click. No rub.   Edema:     Peripheral edema absent.   Musculoskeletal: Normal range of motion. Skin:     General: Skin is warm and dry.   Neurological:      Mental Status: Alert and oriented to person, place, and time.      GCS: GCS eye subscore is 4. GCS verbal subscore is 5. GCS motor subscore is 6.   Psychiatric:         Speech: Speech normal.         Behavior: Behavior normal.         Thought Content: Thought content normal.         Judgment: Judgment normal.           REVIEWED DATA       ECG 12 Lead    Date/Time: 1/31/2025 9:58 AM  Performed by: Rayne Conte APRN    Authorized by: Rayne Conte APRN  Comparison: compared with previous ECG from 1/31/2025  Similar to previous ECG  Rhythm: sinus rhythm  Rate: normal  BPM: 87  Conduction: conduction normal  ST Segments: ST segments normal  T Waves: T waves normal  QRS axis: normal    Clinical impression: normal ECG          Cardiac Procedures:  Echocardiogram 10/2/2020.  LVEF 63%.  LV systolic function is normal.  Mild mitral valve regurgitation.  Mild tricuspid valve regurgitation.  Estimated RVSP less than 35 mmHg.  Carotid duplex 10/2/2020.  Proximal left carotid with plaque but no significant stenosis.  48-hour Holter 10/5/2020.  Relatively benign monitor study.  ZIO monitor 12/5/2020.  Episodes of SVT with irregularity in rhythm suggesting atrial fibrillation.  Dr. Riley personally reviewed the tracings and diagnosed patient with atrial fibrillation.    Lipid Panel          6/13/2024    08:01   Lipid Panel   Total Cholesterol 198    Triglycerides  "52    HDL Cholesterol 70    VLDL Cholesterol 10    LDL Cholesterol  118        Lab Results   Component Value Date     06/13/2024     06/05/2023    K 4.4 06/13/2024    K 4.7 06/05/2023     06/13/2024     06/05/2023    CO2 27.8 06/13/2024    CO2 28.4 06/05/2023    BUN 13 06/13/2024    BUN 17 06/05/2023    CREATININE 0.78 06/13/2024    CREATININE 0.79 06/05/2023    EGFRIFNONA 66 03/23/2021    EGFRIFNONA 66 11/11/2020    EGFRIFAFRI 80 03/23/2021    EGFRIFAFRI 83 09/18/2020    GLUCOSE 89 06/13/2024    GLUCOSE 98 06/05/2023    CALCIUM 9.4 06/13/2024    CALCIUM 9.7 06/05/2023    PROTENTOTREF 6.3 06/13/2024    PROTENTOTREF 6.2 06/05/2023    ALBUMIN 4.4 06/13/2024    ALBUMIN 4.4 06/05/2023    BILITOT 0.3 06/13/2024    BILITOT 0.3 06/05/2023    AST 15 06/13/2024    AST 15 06/05/2023    ALT 13 06/13/2024    ALT 14 06/05/2023     Lab Results   Component Value Date    WBC 3.4 05/26/2022    WBC 3.61 03/23/2021    HGB 12.2 05/26/2022    HGB 12.4 03/23/2021    HCT 36.8 05/26/2022    HCT 38.2 03/23/2021    MCV 93 05/26/2022    MCV 94.6 03/23/2021     05/26/2022     03/23/2021     No results found for: \"PROBNP\", \"BNP\"  Lab Results   Component Value Date    TROPONINT <0.010 11/11/2020     Lab Results   Component Value Date    TSH 1.520 06/13/2024    TSH 1.390 06/05/2023             ASSESSMENT & PLAN     Diagnoses and all orders for this visit:    1. Paroxysmal atrial fibrillation (Primary)  Overview:  CHADS-VASc Risk Assessment               4 Total Score    1 Hypertension    2 Age >/= 75    1 Sex: Female    Criteria that do not apply:    CHF    DM    PRIOR STROKE/TIA/THROMBO    Vascular Disease    Age 65-74              Assessment & Plan:  Sinus rhythm on ECG  Continue apixaban 5 mg twice daily    Orders:  -     apixaban (Eliquis) 5 MG tablet tablet; Take 1 tablet by mouth Every 12 (Twelve) Hours.  Dispense: 180 tablet; Refill: 3    2. Benign essential hypertension  Overview:  Lisinopril 40 " mg/day    Assessment & Plan:  Hypertension is stable and controlled  Continue current treatment regimen.  Regular aerobic exercise.  Ambulatory blood pressure monitoring.  Blood pressure will be reassessed in 1 year.      Other orders  -     ECG 12 Lead        Return for Dr. Titus-follow up.    Future Appointments         Provider Department Center    6/13/2025 8:00 AM (Arrive by 7:45 AM) [facility] PR[facility]    6/20/2025 8:00 AM (Arrive by 7:45 AM) Swetha Britt PA-C [facility] PRIMARY CARE [facility]    2/2/2026 9:20 AM Helena Titus MD [facility] CARDIOLOGY [facility]              MEDICATIONS         Discharge Medications            Accurate as of January 31, 2025  1:03 PM. If you have any questions, ask your nurse or doctor.                Changes to Medications        Instructions Start Date   apixaban 5 MG tablet tablet  Commonly known as: Eliquis  What changed: when to take this  Changed by: BRAULIO Reynaga   5 mg, Oral, Every 12 Hours Scheduled             Continue These Medications        Instructions Start Date   calcium carbonate 600 MG tablet  Commonly known as: OS-EMILIA   600 mg, Daily      GLUCOSAMINE 1500 COMPLEX PO   Take  by mouth.      levothyroxine 75 MCG tablet  Commonly known as: SYNTHROID, LEVOTHROID   TAKE 1 TABLET DAILY      lisinopril 40 MG tablet  Commonly known as: PRINIVIL,ZESTRIL   TAKE 1 TABLET DAILY      psyllium 58.6 % packet  Commonly known as: METAMUCIL   1 packet, Daily      Tart Cherry Advanced capsule       Vitamin D3 50 MCG (2000 UT) tablet   Take  by mouth.                   **Dragon Disclaimer:   Much of this encounter note is an electronic transcription/translation of spoken language to printed text. The electronic translation of spoken language may permit erroneous, or at times, nonsensical words or phrases to be inadvertently transcribed. Although I have reviewed the note for such errors, some may  still exist.

## 2025-05-09 DIAGNOSIS — E03.9 ACQUIRED HYPOTHYROIDISM: ICD-10-CM

## 2025-05-09 RX ORDER — LEVOTHYROXINE SODIUM 75 UG/1
75 TABLET ORAL DAILY
Qty: 90 TABLET | Refills: 3 | Status: SHIPPED | OUTPATIENT
Start: 2025-05-09

## 2025-06-14 LAB
ALBUMIN SERPL-MCNC: 4.2 G/DL (ref 3.5–5.2)
ALBUMIN/GLOB SERPL: 2.2 G/DL
ALP SERPL-CCNC: 69 U/L (ref 39–117)
ALT SERPL-CCNC: 9 U/L (ref 1–33)
AST SERPL-CCNC: 15 U/L (ref 1–32)
BASOPHILS # BLD AUTO: 0.04 10*3/MM3 (ref 0–0.2)
BASOPHILS NFR BLD AUTO: 0.9 % (ref 0–1.5)
BILIRUB SERPL-MCNC: 0.4 MG/DL (ref 0–1.2)
BUN SERPL-MCNC: 14 MG/DL (ref 8–23)
BUN/CREAT SERPL: 17.7 (ref 7–25)
CALCIUM SERPL-MCNC: 9.1 MG/DL (ref 8.6–10.5)
CHLORIDE SERPL-SCNC: 104 MMOL/L (ref 98–107)
CHOLEST SERPL-MCNC: 185 MG/DL (ref 0–200)
CHOLEST/HDLC SERPL: 2.89 {RATIO}
CO2 SERPL-SCNC: 28.1 MMOL/L (ref 22–29)
CREAT SERPL-MCNC: 0.79 MG/DL (ref 0.57–1)
EGFRCR SERPLBLD CKD-EPI 2021: 77.6 ML/MIN/1.73
EOSINOPHIL # BLD AUTO: 0.12 10*3/MM3 (ref 0–0.4)
EOSINOPHIL NFR BLD AUTO: 2.7 % (ref 0.3–6.2)
ERYTHROCYTE [DISTWIDTH] IN BLOOD BY AUTOMATED COUNT: 12.8 % (ref 12.3–15.4)
GLOBULIN SER CALC-MCNC: 1.9 GM/DL
GLUCOSE SERPL-MCNC: 84 MG/DL (ref 65–99)
HBA1C MFR BLD: 6 % (ref 4.8–5.6)
HCT VFR BLD AUTO: 36.1 % (ref 34–46.6)
HDLC SERPL-MCNC: 64 MG/DL (ref 40–60)
HGB BLD-MCNC: 11.5 G/DL (ref 12–15.9)
IMM GRANULOCYTES # BLD AUTO: 0.01 10*3/MM3 (ref 0–0.05)
IMM GRANULOCYTES NFR BLD AUTO: 0.2 % (ref 0–0.5)
LDLC SERPL CALC-MCNC: 109 MG/DL (ref 0–100)
LYMPHOCYTES # BLD AUTO: 1.66 10*3/MM3 (ref 0.7–3.1)
LYMPHOCYTES NFR BLD AUTO: 37.4 % (ref 19.6–45.3)
MCH RBC QN AUTO: 30 PG (ref 26.6–33)
MCHC RBC AUTO-ENTMCNC: 31.9 G/DL (ref 31.5–35.7)
MCV RBC AUTO: 94.3 FL (ref 79–97)
MONOCYTES # BLD AUTO: 0.36 10*3/MM3 (ref 0.1–0.9)
MONOCYTES NFR BLD AUTO: 8.1 % (ref 5–12)
NEUTROPHILS # BLD AUTO: 2.25 10*3/MM3 (ref 1.7–7)
NEUTROPHILS NFR BLD AUTO: 50.7 % (ref 42.7–76)
NRBC BLD AUTO-RTO: 0 /100 WBC (ref 0–0.2)
PLATELET # BLD AUTO: 231 10*3/MM3 (ref 140–450)
POTASSIUM SERPL-SCNC: 4.4 MMOL/L (ref 3.5–5.2)
PROT SERPL-MCNC: 6.1 G/DL (ref 6–8.5)
RBC # BLD AUTO: 3.83 10*6/MM3 (ref 3.77–5.28)
SODIUM SERPL-SCNC: 141 MMOL/L (ref 136–145)
TRIGL SERPL-MCNC: 64 MG/DL (ref 0–150)
TSH SERPL DL<=0.005 MIU/L-ACNC: 0.85 UIU/ML (ref 0.27–4.2)
VLDLC SERPL CALC-MCNC: 12 MG/DL (ref 5–40)
WBC # BLD AUTO: 4.44 10*3/MM3 (ref 3.4–10.8)

## 2025-06-20 ENCOUNTER — OFFICE VISIT (OUTPATIENT)
Dept: INTERNAL MEDICINE | Facility: CLINIC | Age: 75
End: 2025-06-20
Payer: MEDICARE

## 2025-06-20 VITALS
BODY MASS INDEX: 22.13 KG/M2 | DIASTOLIC BLOOD PRESSURE: 74 MMHG | HEIGHT: 68 IN | SYSTOLIC BLOOD PRESSURE: 122 MMHG | TEMPERATURE: 97.8 F | WEIGHT: 146 LBS

## 2025-06-20 DIAGNOSIS — I10 BENIGN ESSENTIAL HYPERTENSION: ICD-10-CM

## 2025-06-20 DIAGNOSIS — E55.9 VITAMIN D DEFICIENCY: ICD-10-CM

## 2025-06-20 DIAGNOSIS — Z00.00 MEDICARE ANNUAL WELLNESS VISIT, SUBSEQUENT: Primary | ICD-10-CM

## 2025-06-20 DIAGNOSIS — E03.9 ACQUIRED HYPOTHYROIDISM: ICD-10-CM

## 2025-06-20 DIAGNOSIS — D64.9 ANEMIA, UNSPECIFIED TYPE: ICD-10-CM

## 2025-06-20 DIAGNOSIS — R73.03 PREDIABETES: ICD-10-CM

## 2025-06-20 DIAGNOSIS — E78.2 MIXED HYPERLIPIDEMIA: ICD-10-CM

## 2025-06-20 DIAGNOSIS — Z12.31 ENCOUNTER FOR SCREENING MAMMOGRAM FOR MALIGNANT NEOPLASM OF BREAST: ICD-10-CM

## 2025-06-20 PROBLEM — Z12.11 COLON CANCER SCREENING: Status: RESOLVED | Noted: 2018-08-30 | Resolved: 2025-06-20

## 2025-06-20 NOTE — PROGRESS NOTES
Subjective   The ABCs of the Annual Wellness Visit  Medicare Wellness Visit      Krys Slade is a 75 y.o. patient who presents for a Medicare Wellness Visit.    The following portions of the patient's history were reviewed and   updated as appropriate: allergies, current medications, past family history, past medical history, past social history, past surgical history, and problem list.    Compared to one year ago, the patient's physical   health is the same.  Compared to one year ago, the patient's mental   health is the same.    Recent Hospitalizations:  She was not admitted to the hospital during the last year.     Current Medical Providers:  Patient Care Team:  Swetha Britt PA-C as PCP - General (Physician Assistant)  Nugyễn Cardona MD PhD as Consulting Physician (Hematology and Oncology)  Taylor Russell MD (Inactive) as Consulting Physician (Obstetrics and Gynecology)  Blade Riley MD as Consulting Physician (Cardiology)    Outpatient Medications Prior to Visit   Medication Sig Dispense Refill    apixaban (Eliquis) 5 MG tablet tablet Take 1 tablet by mouth Every 12 (Twelve) Hours. 180 tablet 3    calcium carbonate (OS-EMILIA) 600 MG tablet Take 1 tablet by mouth Daily.      Cholecalciferol (VITAMIN D3) 2000 UNITS tablet Take  by mouth.      Glucosamine-Chondroit-Vit C-Mn (GLUCOSAMINE 1500 COMPLEX PO) Take  by mouth.      levothyroxine (SYNTHROID, LEVOTHROID) 75 MCG tablet TAKE 1 TABLET DAILY 90 tablet 3    lisinopril (PRINIVIL,ZESTRIL) 40 MG tablet TAKE 1 TABLET DAILY 90 tablet 3    psyllium (METAMUCIL) 58.6 % packet Take 1 packet by mouth Daily.      Misc Natural Products (Tart Cherry Advanced) capsule        No facility-administered medications prior to visit.     No opioid medication identified on active medication list. I have reviewed chart for other potential  high risk medication/s and harmful drug interactions in the elderly.      Aspirin is not on active medication list.  Aspirin use is  "not indicated based on review of current medical condition/s. Risk of harm outweighs potential benefits.  .    Patient Active Problem List   Diagnosis    Benign essential hypertension    Hyperlipidemia    Hypothyroidism    Lung mass    Prediabetes    Family history of colon cancer    Paroxysmal atrial fibrillation    1st degree AV block     Advance Care Planning Advance Directive is on file.  ACP discussion was held with the patient during this visit. Patient has an advance directive in EMR which is still valid.             Objective   Vitals:    06/20/25 0733   BP: 122/74   Temp: 97.8 °F (36.6 °C)   Weight: 66.2 kg (146 lb)   Height: 172.7 cm (67.99\")   PainSc: 0-No pain       Estimated body mass index is 22.2 kg/m² as calculated from the following:    Height as of this encounter: 172.7 cm (67.99\").    Weight as of this encounter: 66.2 kg (146 lb).    BMI is within normal parameters. No other follow-up for BMI required.           Does the patient have evidence of cognitive impairment? No  Lab Results   Component Value Date    CHLPL 185 06/13/2025    TRIG 64 06/13/2025    HDL 64 (H) 06/13/2025     (H) 06/13/2025    VLDL 12 06/13/2025    HGBA1C 6.00 (H) 06/13/2025                                                                                                Health  Risk Assessment    Smoking Status:  Social History     Tobacco Use   Smoking Status Never    Passive exposure: Never   Smokeless Tobacco Never     Alcohol Consumption:  Social History     Substance and Sexual Activity   Alcohol Use Yes    Alcohol/week: 1.0 standard drink of alcohol    Types: 1 Drinks containing 0.5 oz of alcohol per week    Comment: occassional        Fall Risk Screen  STEADI Fall Risk Assessment was completed, and patient is at LOW risk for falls.Assessment completed on:6/20/2025    Depression Screening   Little interest or pleasure in doing things? Not at all   Feeling down, depressed, or hopeless? Not at all   PHQ-2 Total Score 0 "      Health Habits and Functional and Cognitive Screenin/13/2025    10:58 AM   Functional & Cognitive Status   Do you have difficulty preparing food and eating? No   Do you have difficulty bathing yourself, getting dressed or grooming yourself? No   Do you have difficulty using the toilet? No   Do you have difficulty moving around from place to place? No   Do you have trouble with steps or getting out of a bed or a chair? No   Current Diet Well Balanced Diet   Dental Exam Up to date   Eye Exam Up to date   Exercise (times per week) 5 times per week   Current Exercises Include Gardening;House Cleaning;Light Weights;Walking;Yard Work   Do you need help using the phone?  No   Are you deaf or do you have serious difficulty hearing?  No   Do you need help to go to places out of walking distance? No   Do you need help shopping? No   Do you need help preparing meals?  No   Do you need help with housework?  No   Do you need help with laundry? No   Do you need help taking your medications? No   Do you need help managing money? No   Do you ever drive or ride in a car without wearing a seat belt? No   Have you felt unusual stress, anger or loneliness in the last month? No   Who do you live with? Spouse   If you need help, do you have trouble finding someone available to you? No   Have you been bothered in the last four weeks by sexual problems? No   Do you have difficulty concentrating, remembering or making decisions? No           Age-appropriate Screening Schedule:  Refer to the list below for future screening recommendations based on patient's age, sex and/or medical conditions. Orders for these recommended tests are listed in the plan section. The patient has been provided with a written plan.    Health Maintenance List  Health Maintenance   Topic Date Due    RSV Vaccine - Adults (1 - 1-dose 75+ series) Never done    COVID-19 Vaccine (2024- season) 2025 (Originally 3/30/2025)    INFLUENZA VACCINE   "07/01/2025    LIPID PANEL  06/13/2026    ANNUAL WELLNESS VISIT  06/20/2026    COLORECTAL CANCER SCREENING  07/17/2028    TDAP/TD VACCINES (3 - Td or Tdap) 07/27/2033    HEPATITIS C SCREENING  Completed    Pneumococcal Vaccine 50+  Completed    ZOSTER VACCINE  Completed    MAMMOGRAM  Discontinued    DXA SCAN  Discontinued                                                                                                                                                CMS Preventative Services Quick Reference  Risk Factors Identified During Encounter  None Identified    The above risks/problems have been discussed with the patient.  Pertinent information has been shared with the patient in the After Visit Summary.  An After Visit Summary and PPPS were made available to the patient.    Follow Up:   Next Medicare Wellness visit to be scheduled in 1 year.         Additional E&M Note during same encounter follows:  Patient has additional, significant, and separately identifiable condition(s)/problem(s) that require work above and beyond the Medicare Wellness Visit     Chief Complaint  Medicare Wellness-subsequent    Subjective   HPI          Pt is here today for medicare wellness exam, fup on her hypothyroidism and HTN. Doing great. No changes to her meds with cardio. Feels good. Denies hematochezia or BRB per rectum. Colonoscopy is utd. Mammogram is utd.         Objective   Vital Signs:  /74   Temp 97.8 °F (36.6 °C)   Ht 172.7 cm (67.99\")   Wt 66.2 kg (146 lb)   BMI 22.20 kg/m²   Physical Exam  Vitals reviewed.   Constitutional:       Appearance: She is well-developed.   HENT:      Head: Normocephalic and atraumatic.   Neck:      Vascular: No carotid bruit.   Cardiovascular:      Rate and Rhythm: Normal rate and regular rhythm.      Heart sounds: Normal heart sounds, S1 normal and S2 normal.   Pulmonary:      Effort: Pulmonary effort is normal.      Breath sounds: Normal breath sounds.   Skin:     General: Skin is " warm.   Neurological:      Mental Status: She is alert.   Psychiatric:         Behavior: Behavior normal.              Assessment and Plan         Medicare annual wellness visit, subsequent    Acquired hypothyroidism    Mixed hyperlipidemia     Anemia, unspecified type    Benign essential hypertension    Encounter for screening mammogram for malignant neoplasm of breast    Prediabetes    Vitamin D deficiency    Diagnoses and all orders for this visit:    1. Medicare annual wellness visit, subsequent (Primary)  -     Mammo Screening Digital Tomosynthesis Bilateral With CAD; Future    2. Acquired hypothyroidism  Overview:  Impression: 10/15/2015 - Well controlled on current dose. Recheck labs in 6 mo.  Impression: 04/15/2015 - Check TSH today.  If her TSH is under controlled, we may need to alternate doses.  Impression: 12/16/2014 - Unfortunately, she did not have labs done before this visit.  Will check TSH today.  Impression: 09/16/2014 - Increase Synthroid from 50 to 75 mcg.  Recheck labs in 6 weeks  Impression: 03/19/2014 - check labs today.;     Orders:  -     TSH; Future    3. Mixed hyperlipidemia  Overview:  Impression: 04/15/2015 - Cont to watch diet.  Impression: 09/16/2014 - We will readdress treating this in 3 months after she gets through her lung bx and workup.;     Assessment & Plan:       Orders:  -     Comprehensive Metabolic Panel; Future  -     Lipid Panel With / Chol / HDL Ratio; Future    4. Anemia, unspecified type  -     CBC & Differential; Future  -     CBC & Differential; Future    5. Benign essential hypertension  Overview:  Lisinopril 40 mg/day    Assessment & Plan:        6. Encounter for screening mammogram for malignant neoplasm of breast  -     Mammo Screening Digital Tomosynthesis Bilateral With CAD; Future    7. Prediabetes  -     Hemoglobin A1c; Future    8. Vitamin D deficiency  -     Vitamin D,25-Hydroxy; Future     Mild anemia with a hemoglobin of 11.5, recheck in 1 mo. TSH is  therapeutic. Follows with cardiology for her a fib. BP is great.           Follow Up   Return in about 1 year (around 6/20/2026) for Medicare Wellness, Lab Appt Before FUP.  Patient was given instructions and counseling regarding her condition or for health maintenance advice. Please see specific information pulled into the AVS if appropriate.

## 2025-07-25 NOTE — PROGRESS NOTES
Start PACC Note    Home Health Referral    Evaluated patient on Home Care and services available. Patient offered choice of available HHC and agreeable to PT services with [facility].    Isolation Precautions: No active isolations    START PATIENT REGISTRATION INFORMATION  Order Information  Order Signing Physician: Medhat Cantu MD  Service Ordered RN?: No  Service Ordered PT?: Yes  Service Ordered OT?: No  Service Ordered ST?: No  Service Ordered MSW?: No  Service Ordered HHA?: No  Following Physician: Medhat Cantu MD  Following Physician Phone: [phone]  Overseeing Physician: Aarti Ogden APRN  (Required for Residents Only)  Agreeable to Follow ? Yes  Date/Time of Call 07/25/25 09:42 EDT, Spoke with: per [facility] order for Dr Cantu- Notified Dr Cantu via chat that patient will not be seen until Monday, 7/28/25.  Surgeon agreeable as patient refuses any other agency.    Care Coordination  Same Day SOC?: No  Primary Care Physician: Aarti Ogden APRN  Primary Care Physician Phone: [phone]  Primary Care Physician Address: [address] [place] [address]  Visit Instructions: N/A  Service Discharge Location Type: Home  Service Facility Name: N/A  Service Floor Facility: N/A  Service Room No: N/A        Demographics  Patient Last Name: Mohsen  Patient First Name: Alisha  Language/Communication Barrier: none  Service Address: [address] [place] [address]  Service City: [place]  Service State: [place]  Service Zip: [facility]  Service Home Phone: [phone]  Other Phone Numbers:   Telephone Information:   Mobile [phone]     Emergency Contact:   Extended Emergency Contact Information  Primary Emergency Contact: KELSEA SMART  Address: [address] [place] [address]  Mobile Phone: [phone]  Relation: Spouse    Admission Information  Admit Date: 7/24/2025  Patient Status at Discharge: Observation  Admitting Diagnosis: Arthritis of knee  Subjective   Chief Complaint   Patient presents with   • Poison Ivy       History of Present Illness     Pt is here today with cc of poison ivy. Rash started on left lower extremity now extending the entire length of the left leg starting at her knee. Has rash spreading to her right thigh also. Not present anywhere else.      Patient Active Problem List   Diagnosis   • Benign essential hypertension   • Hyperlipidemia   • Hypothyroidism   • Lung mass   • Prediabetes   • Abnormal CBC   • Family history of colon cancer   • Colon cancer screening   • Paroxysmal atrial fibrillation (HCC)       No Known Allergies    Current Outpatient Medications on File Prior to Visit   Medication Sig Dispense Refill   • apixaban (ELIQUIS) 5 MG tablet tablet Take 1 tablet by mouth Every 12 (Twelve) Hours. 180 tablet 3   • atenolol (TENORMIN) 25 MG tablet Take 1 tablet by mouth Daily As Needed (Palpitations, fast heart rate). 30 tablet 1   • calcium carbonate (OS-EMILIA) 600 MG tablet Take 600 mg by mouth daily.     • Cholecalciferol (VITAMIN D3) 2000 UNITS tablet Take  by mouth.     • Glucosamine-Chondroit-Vit C-Mn (GLUCOSAMINE 1500 COMPLEX PO) Take  by mouth.     • lisinopril (PRINIVIL,ZESTRIL) 40 MG tablet TAKE 1 TABLET DAILY 90 tablet 3   • psyllium (METAMUCIL) 58.6 % packet Take 1 packet by mouth daily.     • Synthroid 75 MCG tablet Take 1 tablet by mouth Daily. 90 tablet 3     No current facility-administered medications on file prior to visit.       Past Medical History:   Diagnosis Date   • Colon polyp    • Disease of thyroid gland    • Hyperlipidemia    • Hypertension    • Lung mass    • Palpitations    • Tonsillitis        Family History   Problem Relation Age of Onset   • Heart valve disorder Mother    • Stroke Mother    • Atrial fibrillation Mother    • Coronary artery disease Father         Father with a four-vessel CABG at age 64   • Macular degeneration Father    • Colon cancer Brother    • Atrial fibrillation Sister   "      Social History     Socioeconomic History   • Marital status:    • Number of children: 2   Tobacco Use   • Smoking status: Never Smoker   • Smokeless tobacco: Never Used   Vaping Use   • Vaping Use: Never used   Substance and Sexual Activity   • Alcohol use: Yes     Comment: occassional    • Drug use: No   • Sexual activity: Defer       Past Surgical History:   Procedure Laterality Date   • COLONOSCOPY N/A 11/5/2018    Procedure: COLONOSCOPY TO CECUM;  Surgeon: Yelean Mcduffie MD;  Location: [facility] ENDOSCOPY;  Service: General   • HYSTERECTOMY     • MOHS SURGERY     • TONSILLECTOMY           The following portions of the patient's history were reviewed and updated as appropriate: problem list, allergies, current medications, past medical history, past family history, past social history and past surgical history.    ROS     See HPI    Immunization History   Administered Date(s) Administered   • COVID-19 (PFIZER) PURPLE CAP 01/23/2021, 02/13/2021, 10/16/2021   • FLUAD TRI 65YR+ 10/01/2019   • Flu Vaccine Quad PF >36MO 09/04/2020   • FluMist 2-49yrs 11/07/2013, 09/16/2014, 10/15/2015   • Fluad Quad 65+ 10/01/2019, 09/04/2020, 08/26/2021   • Fluzone High Dose =>65 Years ([employer] ONLY) 10/12/2016, 10/19/2017   • Influenza, Unspecified 09/04/2020, 08/26/2021   • Pneumococcal Conjugate 13-Valent (PCV13) 04/26/2016   • Pneumococcal Polysaccharide (PPSV23) 09/16/2014   • Shingrix 10/02/2019, 10/27/2019, 12/06/2019   • Tdap 01/01/2012   • Zostavax 11/12/2009       Objective   Vitals:    06/14/22 1441   Temp: 97.5 °F (36.4 °C)   Weight: 68.9 kg (152 lb)   Height: 172.7 cm (68\")     Body mass index is 23.11 kg/m².   Decline bP check today  Physical Exam  Vitals reviewed.   Constitutional:       Appearance: Normal appearance.   HENT:      Head: Normocephalic and atraumatic.   Cardiovascular:      Rate and Rhythm: Normal rate and regular rhythm.      Heart sounds: Normal heart sounds.   Neurological:      Mental " [M17.10]  Osteoarthritis of right knee [M17.11]  H/O total knee replacement [Z96.659]    Caregiver Information  Caregiver First Name:   Caregiver Last Name:   Caregiver Relationship to Patient:   Caregiver Phone Number:   Caregiver Notes:     HITECH  [employer] List  HIGHTECH: HI TECH - FRESH ORTHO  Procedure: RTK  Date of Procedure: 7/24/25  Precautions: None  Surgeon: Medhat Cantu MD      END PATIENT REGISTRATION INFORMATION        Start PACC Summary    Additional Comments: Call patient's cell primary [phone], spouse secondary    END PACC Summary    Discharge Date: Pending    Referral Source: [facility]    Signed By: Yakelin Hollins RN, 7/25/2025, 09:42 EDT     Date/Time: 07/25/25 09:42 EDT    End PACC Note          Status: She is alert.       Assessment & Plan   Diagnoses and all orders for this visit:    1. Irritant contact dermatitis due to oils (Primary)  -     pantoprazole (Protonix) 20 MG EC tablet; Take 1 tablet by mouth Daily for 14 days.  Dispense: 14 tablet; Refill: 0  -     predniSONE (DELTASONE) 10 MG tablet; Take 3 tablets by mouth Daily for 4 days, THEN 2 tablets Daily for 3 days, THEN 1 tablet Daily for 3 days.  Dispense: 21 tablet; Refill: 0    Start Protonix 20 mg daily for 2 weeks for GI protection. Methylprednisolone injection 40 mg given today in office. Will start oral Prednisone as well. Call if not improving.

## 2025-07-30 ENCOUNTER — LAB (OUTPATIENT)
Facility: HOSPITAL | Age: 75
End: 2025-07-30
Payer: MEDICARE

## 2025-07-30 DIAGNOSIS — D64.9 ANEMIA, UNSPECIFIED TYPE: Primary | ICD-10-CM

## 2025-07-30 LAB
BASOPHILS # BLD AUTO: 0.04 10*3/MM3 (ref 0–0.2)
BASOPHILS NFR BLD AUTO: 1 % (ref 0–1.5)
DEPRECATED RDW RBC AUTO: 43.7 FL (ref 37–54)
EOSINOPHIL # BLD AUTO: 0.12 10*3/MM3 (ref 0–0.4)
EOSINOPHIL NFR BLD AUTO: 2.9 % (ref 0.3–6.2)
ERYTHROCYTE [DISTWIDTH] IN BLOOD BY AUTOMATED COUNT: 12.8 % (ref 12.3–15.4)
HCT VFR BLD AUTO: 36.6 % (ref 34–46.6)
HGB BLD-MCNC: 12 G/DL (ref 12–15.9)
IMM GRANULOCYTES # BLD AUTO: 0.02 10*3/MM3 (ref 0–0.05)
IMM GRANULOCYTES NFR BLD AUTO: 0.5 % (ref 0–0.5)
LYMPHOCYTES # BLD AUTO: 1.52 10*3/MM3 (ref 0.7–3.1)
LYMPHOCYTES NFR BLD AUTO: 36.9 % (ref 19.6–45.3)
MCH RBC QN AUTO: 30.7 PG (ref 26.6–33)
MCHC RBC AUTO-ENTMCNC: 32.8 G/DL (ref 31.5–35.7)
MCV RBC AUTO: 93.6 FL (ref 79–97)
MONOCYTES # BLD AUTO: 0.38 10*3/MM3 (ref 0.1–0.9)
MONOCYTES NFR BLD AUTO: 9.2 % (ref 5–12)
NEUTROPHILS NFR BLD AUTO: 2.04 10*3/MM3 (ref 1.7–7)
NEUTROPHILS NFR BLD AUTO: 49.5 % (ref 42.7–76)
NRBC BLD AUTO-RTO: 0 /100 WBC (ref 0–0.2)
PATHOLOGY REVIEW: YES
PLATELET # BLD AUTO: 246 10*3/MM3 (ref 140–450)
PMV BLD AUTO: 10.2 FL (ref 6–12)
RBC # BLD AUTO: 3.91 10*6/MM3 (ref 3.77–5.28)
WBC NRBC COR # BLD AUTO: 4.12 10*3/MM3 (ref 3.4–10.8)

## 2025-07-30 PROCEDURE — 82728 ASSAY OF FERRITIN: CPT | Performed by: PHYSICIAN ASSISTANT

## 2025-07-30 PROCEDURE — 85025 COMPLETE CBC W/AUTO DIFF WBC: CPT

## 2025-07-30 PROCEDURE — 83540 ASSAY OF IRON: CPT | Performed by: PHYSICIAN ASSISTANT

## 2025-07-30 PROCEDURE — 82607 VITAMIN B-12: CPT | Performed by: PHYSICIAN ASSISTANT

## 2025-07-30 PROCEDURE — 84466 ASSAY OF TRANSFERRIN: CPT | Performed by: PHYSICIAN ASSISTANT

## 2025-07-31 ENCOUNTER — LAB (OUTPATIENT)
Facility: HOSPITAL | Age: 75
End: 2025-07-31
Payer: MEDICARE

## 2025-07-31 LAB
LAB AP CASE REPORT: NORMAL
PATH REPORT.FINAL DX SPEC: NORMAL

## (undated) DEVICE — THE TORRENT IRRIGATION SCOPE CONNECTOR IS USED WITH THE TORRENT IRRIGATION TUBING TO PROVIDE IRRIGATION FLUIDS SUCH AS STERILE WATER DURING GASTROINTESTINAL ENDOSCOPIC PROCEDURES WHEN USED IN CONJUNCTION WITH AN IRRIGATION PUMP (OR ELECTROSURGICAL UNIT).: Brand: TORRENT

## (undated) DEVICE — CANNULA,ADULT,SOFT-TOUCH,7'TUBE,UC: Brand: PENDING

## (undated) DEVICE — TUBING, SUCTION, 1/4" X 10', STRAIGHT: Brand: MEDLINE

## (undated) DEVICE — Device: Brand: DEFENDO AIR/WATER/SUCTION AND BIOPSY VALVE